# Patient Record
Sex: MALE | Race: WHITE | HISPANIC OR LATINO
[De-identification: names, ages, dates, MRNs, and addresses within clinical notes are randomized per-mention and may not be internally consistent; named-entity substitution may affect disease eponyms.]

---

## 2019-08-27 ENCOUNTER — APPOINTMENT (OUTPATIENT)
Dept: ULTRASOUND IMAGING | Facility: CLINIC | Age: 44
End: 2019-08-27

## 2020-03-24 ENCOUNTER — EMERGENCY (EMERGENCY)
Facility: HOSPITAL | Age: 45
LOS: 1 days | Discharge: HOME | End: 2020-03-24
Attending: EMERGENCY MEDICINE
Payer: COMMERCIAL

## 2020-03-24 VITALS — RESPIRATION RATE: 18 BRPM | HEART RATE: 92 BPM | OXYGEN SATURATION: 98 %

## 2020-03-24 VITALS
HEART RATE: 88 BPM | DIASTOLIC BLOOD PRESSURE: 95 MMHG | TEMPERATURE: 98 F | WEIGHT: 184.97 LBS | RESPIRATION RATE: 18 BRPM | SYSTOLIC BLOOD PRESSURE: 136 MMHG | HEIGHT: 67 IN

## 2020-03-24 PROCEDURE — 99283 EMERGENCY DEPT VISIT LOW MDM: CPT

## 2020-03-24 RX ORDER — ONDANSETRON 8 MG/1
1 TABLET, FILM COATED ORAL
Qty: 10 | Refills: 0
Start: 2020-03-24 | End: 2020-04-02

## 2020-03-24 NOTE — ED ADULT TRIAGE NOTE - CHIEF COMPLAINT QUOTE
c/o was exposed to a coworker with Corona virus, last tuesday, Saturday started with a fever , c/o nausea, back pain, + cough

## 2020-03-24 NOTE — ED PROVIDER NOTE - CLINICAL SUMMARY MEDICAL DECISION MAKING FREE TEXT BOX
male with + covid contact with 4 days congestion cough and 1 day nausea and 1 episode of vomiting nontoxic appearing male stable vitals. advised on conservative management self quarantine instructions and strict return precautions

## 2020-03-24 NOTE — ED PROVIDER NOTE - OBJECTIVE STATEMENT
45 yo male hx of niddm and RA presenting to the ER with cold symptoms including congestion and cough with mild nausea and one episode of vomiting today. + covid positive contact. states that symptoms started on saturday. unsure of fevers at home. denies chest pain sob or abdominal pain no diarrhea. no medication taken for symptoms

## 2020-03-24 NOTE — ED PROVIDER NOTE - PROGRESS NOTE DETAILS
advised on monitoring symptoms at home, conservative management of symptoms and strict return percautions.   pt verbalizes understanding

## 2020-03-24 NOTE — ED PROVIDER NOTE - NSFOLLOWUPINSTRUCTIONS_ED_ALL_ED_FT
tylenol 650mg every 6 hours for symptoms     READ ALL ATTACHED INSTRUCTIONS FOR CORONAVIRUS IMMEDIATELY   - Do not go to work/school/public areas/public transit.  - Self quarantine for 14 days.  - Monitor your symptoms using symptom tracker.  - Practice social distancing and avoid contact with others. Avoid sharing personal household items.   - Practice proper hand hygiene. Disinfect surfaces frequently. Cover your coughs and sneezes.   - Stay hydrated.      SEEK IMMEDIATE MEDICAL CARE IF YOU HAVE ANY OF THE FOLLOWING SYMPTOMS  **Seek immediate medicate attention if you develop new/worsening, signs/symptoms or concerns including, but not limited to shortness of breath, difficulty breathing, chest pain, confusion, severe weakness.**    If you believe you are experiencing a medical emergency call 9-1-1.

## 2020-03-24 NOTE — ED PROVIDER NOTE - PATIENT PORTAL LINK FT
You can access the FollowMyHealth Patient Portal offered by [facility] by registering at the following website: [email or web address]. By joining [employer]’s FollowMyHealth portal, you will also be able to view your health information using other applications (apps) compatible with our system.

## 2020-03-24 NOTE — ED PROVIDER NOTE - ATTENDING CONTRIBUTION TO CARE
The patient seen and examined    Viral syndrome  Most likely COVID-19   The patient appears well  Clear instructions given    I, Dany Martinez, performed the initial face to face bedside interview with this patient regarding history of present illness, review of symptoms and relevant past medical, social and family history.  I completed an independent physical examination.  I was the initial provider who evaluated this patient. I have signed out the follow up of any pending tests (i.e. labs, radiological studies) to the ACP.  I have communicated the patient’s plan of care and disposition with the ACP.

## 2022-01-10 ENCOUNTER — EMERGENCY (EMERGENCY)
Facility: HOSPITAL | Age: 47
LOS: 1 days | Discharge: HOME | End: 2022-01-10
Attending: EMERGENCY MEDICINE | Admitting: EMERGENCY MEDICINE
Payer: COMMERCIAL

## 2022-01-10 VITALS
RESPIRATION RATE: 20 BRPM | TEMPERATURE: 98 F | DIASTOLIC BLOOD PRESSURE: 96 MMHG | HEART RATE: 102 BPM | OXYGEN SATURATION: 94 % | SYSTOLIC BLOOD PRESSURE: 135 MMHG | HEIGHT: 67 IN | WEIGHT: 179.9 LBS

## 2022-01-10 LAB
ALBUMIN SERPL ELPH-MCNC: 4.4 G/DL — SIGNIFICANT CHANGE UP (ref 3.3–5.2)
ALP SERPL-CCNC: 166 U/L — HIGH (ref 40–120)
ALT FLD-CCNC: 47 U/L — HIGH
AMPHET UR-MCNC: NEGATIVE — SIGNIFICANT CHANGE UP
ANION GAP SERPL CALC-SCNC: 15 MMOL/L — SIGNIFICANT CHANGE UP (ref 5–17)
AST SERPL-CCNC: 26 U/L — SIGNIFICANT CHANGE UP
BARBITURATES UR SCN-MCNC: NEGATIVE — SIGNIFICANT CHANGE UP
BASOPHILS # BLD AUTO: 0.05 K/UL — SIGNIFICANT CHANGE UP (ref 0–0.2)
BASOPHILS NFR BLD AUTO: 0.7 % — SIGNIFICANT CHANGE UP (ref 0–2)
BENZODIAZ UR-MCNC: NEGATIVE — SIGNIFICANT CHANGE UP
BILIRUB SERPL-MCNC: 0.2 MG/DL — LOW (ref 0.4–2)
BUN SERPL-MCNC: 19.1 MG/DL — SIGNIFICANT CHANGE UP (ref 8–20)
CALCIUM SERPL-MCNC: 9.3 MG/DL — SIGNIFICANT CHANGE UP (ref 8.6–10.2)
CHLORIDE SERPL-SCNC: 99 MMOL/L — SIGNIFICANT CHANGE UP (ref 98–107)
CO2 SERPL-SCNC: 21 MMOL/L — LOW (ref 22–29)
COCAINE METAB.OTHER UR-MCNC: NEGATIVE — SIGNIFICANT CHANGE UP
CREAT SERPL-MCNC: 0.87 MG/DL — SIGNIFICANT CHANGE UP (ref 0.5–1.3)
EOSINOPHIL # BLD AUTO: 0.22 K/UL — SIGNIFICANT CHANGE UP (ref 0–0.5)
EOSINOPHIL NFR BLD AUTO: 3.1 % — SIGNIFICANT CHANGE UP (ref 0–6)
ETHANOL SERPL-MCNC: <10 MG/DL — SIGNIFICANT CHANGE UP (ref 0–9)
GLUCOSE SERPL-MCNC: 347 MG/DL — HIGH (ref 70–99)
HCT VFR BLD CALC: 47.2 % — SIGNIFICANT CHANGE UP (ref 39–50)
HGB BLD-MCNC: 16 G/DL — SIGNIFICANT CHANGE UP (ref 13–17)
IMM GRANULOCYTES NFR BLD AUTO: 1 % — SIGNIFICANT CHANGE UP (ref 0–1.5)
LYMPHOCYTES # BLD AUTO: 3.1 K/UL — SIGNIFICANT CHANGE UP (ref 1–3.3)
LYMPHOCYTES # BLD AUTO: 43.7 % — SIGNIFICANT CHANGE UP (ref 13–44)
MCHC RBC-ENTMCNC: 29.1 PG — SIGNIFICANT CHANGE UP (ref 27–34)
MCHC RBC-ENTMCNC: 33.9 GM/DL — SIGNIFICANT CHANGE UP (ref 32–36)
MCV RBC AUTO: 86 FL — SIGNIFICANT CHANGE UP (ref 80–100)
METHADONE UR-MCNC: NEGATIVE — SIGNIFICANT CHANGE UP
MONOCYTES # BLD AUTO: 0.51 K/UL — SIGNIFICANT CHANGE UP (ref 0–0.9)
MONOCYTES NFR BLD AUTO: 7.2 % — SIGNIFICANT CHANGE UP (ref 2–14)
NEUTROPHILS # BLD AUTO: 3.15 K/UL — SIGNIFICANT CHANGE UP (ref 1.8–7.4)
NEUTROPHILS NFR BLD AUTO: 44.3 % — SIGNIFICANT CHANGE UP (ref 43–77)
OPIATES UR-MCNC: NEGATIVE — SIGNIFICANT CHANGE UP
PCP SPEC-MCNC: SIGNIFICANT CHANGE UP
PCP UR-MCNC: NEGATIVE — SIGNIFICANT CHANGE UP
PLATELET # BLD AUTO: 215 K/UL — SIGNIFICANT CHANGE UP (ref 150–400)
POTASSIUM SERPL-MCNC: 4.1 MMOL/L — SIGNIFICANT CHANGE UP (ref 3.5–5.3)
POTASSIUM SERPL-SCNC: 4.1 MMOL/L — SIGNIFICANT CHANGE UP (ref 3.5–5.3)
PROT SERPL-MCNC: 7.4 G/DL — SIGNIFICANT CHANGE UP (ref 6.6–8.7)
RBC # BLD: 5.49 M/UL — SIGNIFICANT CHANGE UP (ref 4.2–5.8)
RBC # FLD: 12.2 % — SIGNIFICANT CHANGE UP (ref 10.3–14.5)
SODIUM SERPL-SCNC: 135 MMOL/L — SIGNIFICANT CHANGE UP (ref 135–145)
THC UR QL: NEGATIVE — SIGNIFICANT CHANGE UP
WBC # BLD: 7.1 K/UL — SIGNIFICANT CHANGE UP (ref 3.8–10.5)
WBC # FLD AUTO: 7.1 K/UL — SIGNIFICANT CHANGE UP (ref 3.8–10.5)

## 2022-01-10 PROCEDURE — 99285 EMERGENCY DEPT VISIT HI MDM: CPT

## 2022-01-10 RX ORDER — ACETAMINOPHEN 500 MG
650 TABLET ORAL ONCE
Refills: 0 | Status: COMPLETED | OUTPATIENT
Start: 2022-01-10 | End: 2022-01-10

## 2022-01-10 RX ORDER — LEVETIRACETAM 250 MG/1
1500 TABLET, FILM COATED ORAL ONCE
Refills: 0 | Status: COMPLETED | OUTPATIENT
Start: 2022-01-10 | End: 2022-01-10

## 2022-01-10 RX ORDER — SODIUM CHLORIDE 9 MG/ML
3 INJECTION INTRAMUSCULAR; INTRAVENOUS; SUBCUTANEOUS ONCE
Refills: 0 | Status: COMPLETED | OUTPATIENT
Start: 2022-01-10 | End: 2022-01-10

## 2022-01-10 RX ADMIN — SODIUM CHLORIDE 3 MILLILITER(S): 9 INJECTION INTRAMUSCULAR; INTRAVENOUS; SUBCUTANEOUS at 22:21

## 2022-01-10 RX ADMIN — Medication 650 MILLIGRAM(S): at 22:21

## 2022-01-10 NOTE — ED PROVIDER NOTE - CLINICAL SUMMARY MEDICAL DECISION MAKING FREE TEXT BOX
check labs, CT head, if negative, pt to be d/c instructed to drive until further noticed, give f/u with Dr. Morejon

## 2022-01-10 NOTE — ED PROVIDER NOTE - NSFOLLOWUPINSTRUCTIONS_ED_ALL_ED_FT
Keppra 500 mg 2x daily  Follow up with epileptologist (seizure doctor) later today-call for appt  No driving until further notice  Return sooner for any problems      Seizure    A seizure is abnormal electrical activity in the brain; the specific cause may or may not be found. Prior to a seizure you may experience a warning sensation (aura) that may include fear, nausea, dizziness, and visual changes such as flashing lights of spots. Common symptoms during the seizure may include an altered mental status, rhythmic jerking movements, drooling, grunting, loss of bladder or bowel control, or tongue biting. After a seizure, you may feel confused and sleepy.     Do not swim, drive, operate machinery, or engage in any risky activity during which a seizure could cause further injury to you or others. Teach friends and family what to do if you HAVE a seizure which includes laying you on the ground with your head on a cushion and turning you to the side to keep your breathing passages clear in case of vomiting.    SEEK IMMEDIATE MEDICAL CARE IF YOU HAVE ANY OF THE FOLLOWING SYMPTOMS: seizure lasting over 5 minutes, not waking up or persistent altered mental status after the seizure, or more frequent or worsening seizures.

## 2022-01-10 NOTE — ED ADULT TRIAGE NOTE - CHIEF COMPLAINT QUOTE
BIBEMS S/P possible seizure like activity at home.  Pt reports he was drinking coffee on the couch and then began to convulse, witnessed  by wife.  Happened two times.  Did not fall to ground or hit head.  Pt now C/O of a headache.  No seizure hx.  Hx of DM and is on Metformin but did not take it today.  in triage.

## 2022-01-10 NOTE — ED PROVIDER NOTE - OBJECTIVE STATEMENT
45 y/o male with PMHx of DM, RA presents with new onset seizures. Pt states he does remember the event. Pt states earlier today he did not have a headache, went to the [employer] with his family, then was on the couch drinking coffee and his family witnessed seizures like activity. Pt had tongue biting, but denies urinary incontinence. Pt also c/o bilateral leg stiffness. Pt denies recent illness.

## 2022-01-10 NOTE — ED PROVIDER NOTE - PATIENT PORTAL LINK FT
You can access the FollowMyHealth Patient Portal offered by [facility] by registering at the following website: [email or web address]. By joining [employer]’s FollowMyHealth portal, you will also be able to view your health information using other applications (apps) compatible with our system.
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
## 2022-01-10 NOTE — ED ADULT NURSE NOTE - OBJECTIVE STATEMENT
pt a&ox3 had unwitnessed seizure at home and does not recall anything at all. RR even and unlabored line lab and urine sent, pt educated on plan of care, pt able to successfully teach back plan of care to RN, RN will continue to reeducate pt during hospital stay.

## 2022-01-11 LAB
CK MB CFR SERPL CALC: 3 NG/ML — SIGNIFICANT CHANGE UP (ref 0–6.7)
CK SERPL-CCNC: 198 U/L — SIGNIFICANT CHANGE UP (ref 30–200)
TROPONIN T SERPL-MCNC: <0.01 NG/ML — SIGNIFICANT CHANGE UP (ref 0–0.06)

## 2022-01-11 PROCEDURE — 70450 CT HEAD/BRAIN W/O DYE: CPT | Mod: 26,MA

## 2022-01-11 PROCEDURE — 99284 EMERGENCY DEPT VISIT MOD MDM: CPT | Mod: 25

## 2022-01-11 PROCEDURE — 82550 ASSAY OF CK (CPK): CPT

## 2022-01-11 PROCEDURE — 80307 DRUG TEST PRSMV CHEM ANLYZR: CPT

## 2022-01-11 PROCEDURE — 70450 CT HEAD/BRAIN W/O DYE: CPT | Mod: MA

## 2022-01-11 PROCEDURE — 85025 COMPLETE CBC W/AUTO DIFF WBC: CPT

## 2022-01-11 PROCEDURE — 82553 CREATINE MB FRACTION: CPT

## 2022-01-11 PROCEDURE — 96365 THER/PROPH/DIAG IV INF INIT: CPT

## 2022-01-11 PROCEDURE — 36415 COLL VENOUS BLD VENIPUNCTURE: CPT

## 2022-01-11 PROCEDURE — 82962 GLUCOSE BLOOD TEST: CPT

## 2022-01-11 PROCEDURE — 80053 COMPREHEN METABOLIC PANEL: CPT

## 2022-01-11 PROCEDURE — 84484 ASSAY OF TROPONIN QUANT: CPT

## 2022-01-11 RX ORDER — LEVETIRACETAM 250 MG/1
1 TABLET, FILM COATED ORAL
Qty: 30 | Refills: 0
Start: 2022-01-11 | End: 2022-01-25

## 2022-01-11 RX ADMIN — LEVETIRACETAM 1500 MILLIGRAM(S): 250 TABLET, FILM COATED ORAL at 00:33

## 2022-01-11 RX ADMIN — LEVETIRACETAM 400 MILLIGRAM(S): 250 TABLET, FILM COATED ORAL at 00:09

## 2022-01-11 RX ADMIN — Medication 650 MILLIGRAM(S): at 00:07

## 2022-01-14 PROBLEM — E11.9 TYPE 2 DIABETES MELLITUS WITHOUT COMPLICATIONS: Chronic | Status: ACTIVE | Noted: 2022-01-11

## 2022-01-14 PROBLEM — M06.9 RHEUMATOID ARTHRITIS, UNSPECIFIED: Chronic | Status: ACTIVE | Noted: 2022-01-11

## 2022-01-28 ENCOUNTER — APPOINTMENT (OUTPATIENT)
Dept: NEUROLOGY | Facility: CLINIC | Age: 47
End: 2022-01-28
Payer: COMMERCIAL

## 2022-01-28 ENCOUNTER — NON-APPOINTMENT (OUTPATIENT)
Age: 47
End: 2022-01-28

## 2022-01-28 VITALS
HEIGHT: 66 IN | BODY MASS INDEX: 29.27 KG/M2 | OXYGEN SATURATION: 98 % | DIASTOLIC BLOOD PRESSURE: 84 MMHG | HEART RATE: 82 BPM | WEIGHT: 182.1 LBS | TEMPERATURE: 98.2 F | SYSTOLIC BLOOD PRESSURE: 128 MMHG

## 2022-01-28 DIAGNOSIS — Z78.9 OTHER SPECIFIED HEALTH STATUS: ICD-10-CM

## 2022-01-28 DIAGNOSIS — Z87.39 PERSONAL HISTORY OF OTHER DISEASES OF THE MUSCULOSKELETAL SYSTEM AND CONNECTIVE TISSUE: ICD-10-CM

## 2022-01-28 DIAGNOSIS — R56.9 UNSPECIFIED CONVULSIONS: ICD-10-CM

## 2022-01-28 PROCEDURE — 99205 OFFICE O/P NEW HI 60 MIN: CPT

## 2022-01-28 PROCEDURE — 93040 RHYTHM ECG WITH REPORT: CPT

## 2022-01-28 PROCEDURE — 95816 EEG AWAKE AND DROWSY: CPT
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
## 2022-01-28 NOTE — PHYSICAL EXAM
[FreeTextEntry1] : GENERAL PHYSICAL EXAM:\par GEN: no distress, normal affect\par HEENT: NCAT, OP clear\par EYES: sclera white, conjunctiva clear, no nystagmus\par NECK: supple\par CV: RRR    		\par PULM: CTAB, no wheezing\par GI: soft ABD, +BS, NT, ND\par EXT: peripheral pulse intact, no cyanosis\par MSK: muscle tone and strength normal\par SKIN: warm, dry, no rash or lesion on exposed skin \par \par NEUROLOGICAL EXAM:\par Mental Status\par Orientation: awake and alert  // alert and oriented to person, place, time, and situation \par Language: clear and fluent\par \par Cranial Nerves\par II: full visual fields intact \par III, IV, VI: PERRL, EOMI\par V, VII: facial sensation and movement intact and symmetric \par VIII: hearing intact \par IX, X: uvula midline, soft palate elevates normally \par XI: BL shoulder shrug intact \par XII: tongue midline\par \par Motor\par 5/5 in RUE, RLE\par 5/5 in LUE, LLE             \par Tone and bulk are normal in upper and lower limbs\par No pronator drift\par \par Sensation\par Intact to light touch and pinprick in all 4 EXTs\par \par Reflex\par 2+ in BL biceps, triceps, brachioradialis, patella, ankle                                    \par Plantar responses downward bilaterally\par \par Coordination\par Normal FTN bilaterally\par \par Gait\par Ambulates independently \par Normal stance, stride, and pivot turn\par Tandem walk intact\par Negative Romberg\par

## 2022-01-28 NOTE — ASSESSMENT
[FreeTextEntry1] : YEMI ANAYA is a 46 year-old RH male with a history of DM and RA who presents for workup after first lifetime BTC sz without obvious risk factors. \par \par \par - Admit to EMU 2/18. The purpose of the EMU admission is to establish the first diagnosis of a seizure disorder, which is medically necessary to guide clinical management and select the most appropriate therapeutic regimen. The expected length of stay is 3-4 days.\par  \par - hold ASM for now\par - MRI brain w/o, epilepsy protocol\par - Patient was educated regarding risks and driving privileges associated with the [facility] Guidelines; patient instructed not to drive. \par - f/u pending EMU result\par \par \par Personally reviewed all images, labs and other studies. More than 50% of time spent on counseling and educating patient on differential, workup, disease course, and treatment/management. All questions and concerns answered and addressed in detail to patient's complete satisfaction. Patient verbalized understanding and agreed to plan.\par

## 2022-01-28 NOTE — CONSULT LETTER
[Dear  ___] : Dear  [unfilled], [Sincerely,] : Sincerely, [FreeTextEntry1] : I had the pleasure of seeing your patient, YEMI ANAYA, in my office today. Please see my note below. \par \par If you have any questions, please do not hesitate to contact me.  [FreeTextEntry3] : Jaja Morejon MD\par Director, Epilepsy/EMU\par [facility] \par [facility] [place]\par [address] [place] [address] \par Tel: [phone]; Fax: [phone]

## 2022-02-08 ENCOUNTER — OUTPATIENT (OUTPATIENT)
Dept: OUTPATIENT SERVICES | Facility: HOSPITAL | Age: 47
LOS: 1 days | End: 2022-02-08

## 2022-02-08 DIAGNOSIS — R56.9 UNSPECIFIED CONVULSIONS: ICD-10-CM

## 2022-02-16 ENCOUNTER — APPOINTMENT (OUTPATIENT)
Dept: MRI IMAGING | Facility: CLINIC | Age: 47
End: 2022-02-16
Payer: COMMERCIAL

## 2022-02-16 ENCOUNTER — OUTPATIENT (OUTPATIENT)
Dept: OUTPATIENT SERVICES | Facility: HOSPITAL | Age: 47
LOS: 1 days | End: 2022-02-16
Payer: COMMERCIAL

## 2022-02-16 DIAGNOSIS — R56.9 UNSPECIFIED CONVULSIONS: ICD-10-CM

## 2022-02-16 DIAGNOSIS — Z00.00 ENCOUNTER FOR GENERAL ADULT MEDICAL EXAMINATION WITHOUT ABNORMAL FINDINGS: ICD-10-CM

## 2022-02-16 PROCEDURE — A9585: CPT

## 2022-02-16 PROCEDURE — 70553 MRI BRAIN STEM W/O & W/DYE: CPT | Mod: 26

## 2022-02-16 PROCEDURE — 70553 MRI BRAIN STEM W/O & W/DYE: CPT

## 2022-02-16 PROCEDURE — 76377 3D RENDER W/INTRP POSTPROCES: CPT

## 2022-02-16 PROCEDURE — 76377 3D RENDER W/INTRP POSTPROCES: CPT | Mod: 26

## 2022-03-18 ENCOUNTER — INPATIENT (INPATIENT)
Facility: HOSPITAL | Age: 47
LOS: 1 days | Discharge: HOME | DRG: 101 | End: 2022-03-20
Attending: INTERNAL MEDICINE | Admitting: FAMILY MEDICINE
Payer: COMMERCIAL

## 2022-03-18 VITALS
TEMPERATURE: 98 F | DIASTOLIC BLOOD PRESSURE: 82 MMHG | OXYGEN SATURATION: 96 % | RESPIRATION RATE: 15 BRPM | HEART RATE: 72 BPM | SYSTOLIC BLOOD PRESSURE: 130 MMHG

## 2022-03-18 DIAGNOSIS — R56.9 UNSPECIFIED CONVULSIONS: ICD-10-CM

## 2022-03-18 LAB
GLUCOSE BLDC GLUCOMTR-MCNC: 174 MG/DL — HIGH (ref 70–99)
GLUCOSE BLDC GLUCOMTR-MCNC: 212 MG/DL — HIGH (ref 70–99)

## 2022-03-18 PROCEDURE — 99223 1ST HOSP IP/OBS HIGH 75: CPT

## 2022-03-18 PROCEDURE — 95718 EEG PHYS/QHP 2-12 HR W/VEEG: CPT

## 2022-03-18 PROCEDURE — 99232 SBSQ HOSP IP/OBS MODERATE 35: CPT

## 2022-03-18 RX ORDER — DEXTROSE 50 % IN WATER 50 %
25 SYRINGE (ML) INTRAVENOUS ONCE
Refills: 0 | Status: DISCONTINUED | OUTPATIENT
Start: 2022-03-18 | End: 2022-03-20

## 2022-03-18 RX ORDER — SODIUM CHLORIDE 9 MG/ML
1000 INJECTION, SOLUTION INTRAVENOUS
Refills: 0 | Status: DISCONTINUED | OUTPATIENT
Start: 2022-03-18 | End: 2022-03-20

## 2022-03-18 RX ORDER — ACETAMINOPHEN 500 MG
650 TABLET ORAL EVERY 6 HOURS
Refills: 0 | Status: DISCONTINUED | OUTPATIENT
Start: 2022-03-18 | End: 2022-03-20

## 2022-03-18 RX ORDER — ETANERCEPT 25 MG
1 VIAL (EA) SUBCUTANEOUS
Qty: 0 | Refills: 0 | DISCHARGE

## 2022-03-18 RX ORDER — INSULIN LISPRO 100/ML
VIAL (ML) SUBCUTANEOUS
Refills: 0 | Status: DISCONTINUED | OUTPATIENT
Start: 2022-03-18 | End: 2022-03-20

## 2022-03-18 RX ORDER — INFLUENZA VIRUS VACCINE 15; 15; 15; 15 UG/.5ML; UG/.5ML; UG/.5ML; UG/.5ML
0.5 SUSPENSION INTRAMUSCULAR ONCE
Refills: 0 | Status: DISCONTINUED | OUTPATIENT
Start: 2022-03-18 | End: 2022-03-20

## 2022-03-18 RX ORDER — DEXTROSE 50 % IN WATER 50 %
15 SYRINGE (ML) INTRAVENOUS ONCE
Refills: 0 | Status: DISCONTINUED | OUTPATIENT
Start: 2022-03-18 | End: 2022-03-20

## 2022-03-18 RX ORDER — GLUCAGON INJECTION, SOLUTION 0.5 MG/.1ML
1 INJECTION, SOLUTION SUBCUTANEOUS ONCE
Refills: 0 | Status: DISCONTINUED | OUTPATIENT
Start: 2022-03-18 | End: 2022-03-20

## 2022-03-18 NOTE — CONSULT NOTE ADULT - ASSESSMENT
47 yo man with first lifetime BTC sz without seizure risk factors admitted to EMU to evaluate for epilepsy    First BTC sz  continuous video EEG  no antiseizure medication indicated at this time  Seizure precautions  Ativan 2mg IV push prn convulsive seizure    Plan discussed with Dr. Merlos    Will continue to follow

## 2022-03-18 NOTE — PATIENT PROFILE ADULT - FUNCTIONAL ASSESSMENT - BASIC MOBILITY 4.
Please call and get eye exam from Dr. Evans Choi at [facility] by Tatianna Mistry    Also please mail pt lab orders to him 4 = No assist / stand by assistance

## 2022-03-18 NOTE — H&P ADULT - ASSESSMENT
Pt is a 45yo M with PMH of RA, DM sent from Dr. Morejon office for 24hr EEG. Pt found to have episode of seizure like activity 1/10 and was admitted at [facility] and has been following dr. Morejon. Pt was placed on Keppra 500mg BID x 1 month as dose was 1 month ago. Pt was sent to ED for 24hr EEG to evaluation regarding need for continue keppra.     *Seizure   Admit to medicine   last dose of keppra 500mg BID was 1 month ago   24hr EEG   neurology consult appreciated    *DM   Hold metformin   ISS   f/u HgA1C     *RA  Etanercept 50mg SQ q week   last dose sunday     DVT ppx  Ambulation as needed     Dispo likely 48hr

## 2022-03-18 NOTE — H&P ADULT - HISTORY OF PRESENT ILLNESS
Pt is a 47yo M with PMH of RA, DM sent from Dr. Morejon office for 24hr EEG. Pt found to have episode of seizure like activity 1/10 and was admitted at [facility] and has been following dr. Morejon. Pt was placed on Keppra 500mg BID x 1 month as dose was 1 month ago. Pt was sent to ED for 24hr EEG to evaluation regarding need for continue keppra.

## 2022-03-18 NOTE — H&P ADULT - NSHPPHYSICALEXAM_GEN_ALL_CORE
Vital Signs Last 24 Hrs  T(C): 36.5 (18 Mar 2022 14:30), Max: 36.5 (18 Mar 2022 14:30)  T(F): 97.7 (18 Mar 2022 14:30), Max: 97.7 (18 Mar 2022 14:30)  HR: 72 (18 Mar 2022 14:30) (72 - 72)  BP: 130/82 (18 Mar 2022 14:30) (130/82 - 130/82)  BP(mean): --  RR: 15 (18 Mar 2022 14:30) (15 - 15)  SpO2: 96% (18 Mar 2022 14:30) (96% - 96%)    PHYSICAL EXAM:    GENERAL: NAD, well-groomed, well-developed  HEAD:  NC/AT  EYES: EOMI, PERRLA, no scleral icterus  HEENT: Moist mucous membranes  NECK: Supple, No JVD  CNS:  Alert & Oriented X3, Motor Strength 5/5 B/L upper and lower extremities; DTRs 2+ intact   LUNG: Clear to auscultation bilaterally; No rales, rhonchi, wheezing, or rubs  HEART: RRR; No murmurs, rubs, or gallops  ABDOMEN: +BS, ST/ND/NT  EXTREMITIES:  2+ Peripheral Pulses, No clubbing, cyanosis, or edema  MUSCULOSKELTAL- Joints normal ROM, no Muscle or joint tenderness
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
## 2022-03-19 LAB
A1C WITH ESTIMATED AVERAGE GLUCOSE RESULT: 9 % — HIGH (ref 4–5.6)
ESTIMATED AVERAGE GLUCOSE: 212 MG/DL — HIGH (ref 68–114)
GLUCOSE BLDC GLUCOMTR-MCNC: 190 MG/DL — HIGH (ref 70–99)

## 2022-03-19 PROCEDURE — 95720 EEG PHY/QHP EA INCR W/VEEG: CPT

## 2022-03-19 PROCEDURE — 99232 SBSQ HOSP IP/OBS MODERATE 35: CPT
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
## 2022-03-20 ENCOUNTER — TRANSCRIPTION ENCOUNTER (OUTPATIENT)
Age: 47
End: 2022-03-20

## 2022-03-20 VITALS
HEART RATE: 60 BPM | RESPIRATION RATE: 18 BRPM | OXYGEN SATURATION: 97 % | TEMPERATURE: 97 F | DIASTOLIC BLOOD PRESSURE: 86 MMHG | SYSTOLIC BLOOD PRESSURE: 131 MMHG

## 2022-03-20 LAB
CHOLEST SERPL-MCNC: 186 MG/DL — SIGNIFICANT CHANGE UP
GLUCOSE BLDC GLUCOMTR-MCNC: 157 MG/DL — HIGH (ref 70–99)
HDLC SERPL-MCNC: 35 MG/DL — LOW
LIPID PNL WITH DIRECT LDL SERPL: 112 MG/DL — HIGH
NON HDL CHOLESTEROL: 151 MG/DL — HIGH
TRIGL SERPL-MCNC: 194 MG/DL — HIGH
TSH SERPL-MCNC: 1.64 UIU/ML — SIGNIFICANT CHANGE UP (ref 0.27–4.2)

## 2022-03-20 PROCEDURE — 82962 GLUCOSE BLOOD TEST: CPT

## 2022-03-20 PROCEDURE — 80061 LIPID PANEL: CPT

## 2022-03-20 PROCEDURE — 99232 SBSQ HOSP IP/OBS MODERATE 35: CPT

## 2022-03-20 PROCEDURE — 95711 VEEG 2-12 HR UNMONITORED: CPT

## 2022-03-20 PROCEDURE — 95714 VEEG EA 12-26 HR UNMNTR: CPT

## 2022-03-20 PROCEDURE — 83036 HEMOGLOBIN GLYCOSYLATED A1C: CPT

## 2022-03-20 PROCEDURE — 95700 EEG CONT REC W/VID EEG TECH: CPT

## 2022-03-20 PROCEDURE — 84443 ASSAY THYROID STIM HORMONE: CPT

## 2022-03-20 PROCEDURE — 99238 HOSP IP/OBS DSCHRG MGMT 30/<: CPT

## 2022-03-20 PROCEDURE — 36415 COLL VENOUS BLD VENIPUNCTURE: CPT
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
## 2022-03-20 NOTE — DISCHARGE NOTE PROVIDER - NSDCMRMEDTOKEN_GEN_ALL_CORE_FT
etanercept 50 mg/mL subcutaneous solution: 1 milliliter(s) subcutaneous once a week  metFORMIN 750 mg oral tablet, extended release: 1 tab(s) orally once a day

## 2022-03-20 NOTE — DISCHARGE NOTE NURSING/CASE MANAGEMENT/SOCIAL WORK - PATIENT PORTAL LINK FT
You can access the FollowMyHealth Patient Portal offered by [facility] by registering at the following website: [email or web address]. By joining [facility]’s FollowMyHealth portal, you will also be able to view your health information using other applications (apps) compatible with our system.

## 2022-03-20 NOTE — PROGRESS NOTE ADULT - SUBJECTIVE AND OBJECTIVE BOX
[facility]                                                                     MD Magdalena Webster,                                               Epilepsy Consult #: [phone] ([phone])                                               Office: [address] [place] [address], [facility]                                                 Phone: [phone]; Fax: [phone]    EPILEPSY PROGRESS NOTE    Interval History:  No overnight events    Physical Exam  Vital Signs Last 24 Hrs  T(C): 36.3 (20 Mar 2022 06:35), Max: 36.4 (19 Mar 2022 22:14)  T(F): 97.3 (20 Mar 2022 06:35), Max: 97.5 (19 Mar 2022 22:14)  HR: 60 (20 Mar 2022 06:35) (60 - 74)  BP: 131/86 (20 Mar 2022 06:35) (129/79 - 137/85)  BP(mean): --  RR: 18 (20 Mar 2022 06:35) (15 - 18)  SpO2: 97% (20 Mar 2022 06:35) (96% - 97%)  General: no acute distress  HEENT:NC/AT  Lungs: no respiratory distress  Skin: no rash or ecchymoses  Lower extremities: no edema    Mental Status: AAO x 3, no dysarthria, no aphasia  CN: PERRL, EOMI, VFF, V1-V3 sensation intact, no facial asymmetry  Motor:  5/5 x 4 extremities  Sensory: intact to light touch throughout  Coordination: no dysmetria on FTN  Gait: steady    Imaging  MRI brain 2/16/22: chronic left lentiform nucleus infarct, symmetric normal appearing hippocampi, single coarse calcification along the left parietal lobe with slight T2 signal likely the residua of a remote infection or   inflammation  Routine EEG 1/2022: intermittent left slowing    cEEG 3/18/22-3/20/22 - normal
                                         [facility]                                                                     MD Magdalena Webster,                                               Epilepsy Consult #: [phone] ([phone])                                               Office: [address] [place] [address], [facility]                                                 Phone: [phone]; Fax: [phone]    EPILEPSY PROGRESS NOTE    Interval History:  No overnight events    Physical Exam  Vital Signs Last 24 Hrs  T(C): 36.3 (19 Mar 2022 10:23), Max: 36.9 (19 Mar 2022 06:48)  T(F): 97.4 (19 Mar 2022 10:23), Max: 98.4 (19 Mar 2022 06:48)  HR: 79 (19 Mar 2022 10:23) (70 - 79)  BP: 125/78 (19 Mar 2022 10:23) (111/66 - 130/82)  BP(mean): --  RR: 18 (19 Mar 2022 10:23) (15 - 18)  SpO2: 96% (19 Mar 2022 10:23) (96% - 99%)  General: no acute distress  HEENT:NC/AT  Lungs: no respiratory distress  Skin: no rash or ecchymoses  Lower extremities: no edema    Mental Status: AAO x 3, no dysarthria, no aphasia  CN: PERRL, EOMI, VFF, V1-V3 sensation intact, no facial asymmetry  Motor:  5/5 x 4 extremities  Sensory: intact to light touch throughout  Coordination: no dysmetria on FTN  Gait: steady    Imaging  MRI brain 2/16/22: chronic left lentiform nucleus infarct, symmetric normal appearing hippocampi, single coarse calcification along the left parietal lobe with slight T2 signal likely the residua of a remote infection or   inflammation  Routine EEG 1/2022: intermittent left slowing    cEEG 3/18/22-3/19/22 - normal
  seen for r/o seizure    no acute complaints  wants to go home  ros negative     MEDICATIONS  (STANDING):  dextrose 40% Gel 15 Gram(s) Oral once  dextrose 5%. 1000 milliLiter(s) (50 mL/Hr) IV Continuous <Continuous>  dextrose 50% Injectable 25 Gram(s) IV Push once  glucagon  Injectable 1 milliGRAM(s) IntraMuscular once  influenza   Vaccine 0.5 milliLiter(s) IntraMuscular once  insulin lispro (ADMELOG) corrective regimen sliding scale   SubCutaneous three times a day before meals    MEDICATIONS  (PRN):  acetaminophen     Tablet .. 650 milliGRAM(s) Oral every 6 hours PRN Temp greater or equal to 38C (100.4F), Mild Pain (1 - 3)      Allergies    No Known Allergies      Vital Signs Last 24 Hrs  T(C): 36.3 (19 Mar 2022 10:23), Max: 36.9 (19 Mar 2022 06:48)  T(F): 97.4 (19 Mar 2022 10:23), Max: 98.4 (19 Mar 2022 06:48)  HR: 79 (19 Mar 2022 10:23) (70 - 79)  BP: 125/78 (19 Mar 2022 10:23) (111/66 - 130/82)  BP(mean): --  RR: 18 (19 Mar 2022 10:23) (15 - 18)  SpO2: 96% (19 Mar 2022 10:23) (96% - 99%)    PHYSICAL EXAM:    GENERAL: NAD  CHEST/LUNG: Clear to ausculation bilaterally  HEART: Regular rate and rhythm; S1 S2  ABDOMEN: Soft, Bowel sounds present  EXTREMITIES:  no edema   NERVOUS SYSTEM:  Alert & Oriented X3, Motor Strength 5/5 B/L upper and lower extremities      LABS:                CAPILLARY BLOOD GLUCOSE      POCT Blood Glucose.: 174 mg/dL (18 Mar 2022 21:56)  POCT Blood Glucose.: 212 mg/dL (18 Mar 2022 17:02)        RADIOLOGY & ADDITIONAL TESTS:

## 2022-03-20 NOTE — DISCHARGE NOTE PROVIDER - CARE PROVIDER_API CALL
Jaja Morejon)  EEGEpilepsy; Neurology  [address] [place] [address]  Phone: [phone]  Fax: [phone]  Follow Up Time:     Juan Daniel Ward)  EndocrinologyMetabDiabetes; Internal Medicine  [facility] - Dept of Medicine, [address] [place] [address]  Phone: [phone]  Fax: [phone]  Follow Up Time:

## 2022-03-20 NOTE — PROGRESS NOTE ADULT - ASSESSMENT
47 yo man with first lifetime BTC sz without seizure risk factors admitted to EMU to evaluate for epilepsy    First BTC sz  continuous video EEG normal during stay  discontinue EEG  no antiseizure medication indicated at this time  Seizure precautions  Ativan 2mg IV push prn convulsive seizure  Pt advised not to drive until seizure free for 1 year in accordance with [place] law    Follow up with Dr. Morejon as an outpatient    Plan discussed with Dr. Hamlin    Thank you for allowing me to participate in this patient's care
45yo M with PMH of RA, DM2 sent from Dr. Morejon office for 24hr EEG. Pt found to have episode of seizure like activity 1/10 and was admitted at [facility] and has been following dr. Morejon. Pt was placed on Keppra 500mg BID x 15 days, last dose was 1 month ago. Pt was sent to ED for 24hr EEG to evaluation regarding need for continue keppra.     convulsions: r/o seizures    EEG ongoing     neurology following      DM2: a1c 9    Hold metformin     sliding scale    diabetes education    lipid panel, tsh    advised to f/u with pmd     *RA  Etanercept 50mg SQ q week   last dose sunday     DVT ppx  Ambulation as needed     Dispo   likely tomorrow per neurology discussion 
47 yo man with first lifetime BTC sz without seizure risk factors admitted to EMU to evaluate for epilepsy    First BTC sz  continuous video EEG  continue EEG  no antiseizure medication indicated at this time  Seizure precautions  Ativan 2mg IV push prn convulsive seizure  Likely discharge tomorrow if cEEG stable overnight    Plan discussed with Dr. Hamlin    Will continue to follow

## 2022-03-20 NOTE — DISCHARGE NOTE PROVIDER - NSDCCPCAREPLAN_GEN_ALL_CORE_FT
PRINCIPAL DISCHARGE DIAGNOSIS  Diagnosis: Convulsion  Assessment and Plan of Treatment: follow up with dr walsh      SECONDARY DISCHARGE DIAGNOSES  Diagnosis: Diabetes type 2, uncontrolled  Assessment and Plan of Treatment: follow up with primary care doctor and endocrinology    Diagnosis: High cholesterol  Assessment and Plan of Treatment:      PRINCIPAL DISCHARGE DIAGNOSIS  Diagnosis: Convulsion  Assessment and Plan of Treatment: follow up with dr walsh      SECONDARY DISCHARGE DIAGNOSES  Diagnosis: Diabetes type 2, uncontrolled  Assessment and Plan of Treatment: follow up with primary care doctor and endocrinology    Diagnosis: High cholesterol  Assessment and Plan of Treatment: continue home cholesterol medication

## 2022-03-20 NOTE — DISCHARGE NOTE PROVIDER - HOSPITAL COURSE
47yo M with PMH of RA, DM2 sent from Dr. Morejon office for 24hr EEG. Pt found to have episode of seizure like activity 1/10 and was admitted at [facility] and has been following dr. Morejon. Pt was placed on Keppra 500mg BID x 15 days, last dose was 1 month ago. Pt was sent to ED for 24hr EEG to evaluation regarding need for continue keppra.   Underwent eeg per epilepsy service. no seizures noted.  noted to have elevated hga1c and high cholesterol, advised to follow up with PMD.    dc home.

## 2022-03-20 NOTE — DISCHARGE NOTE PROVIDER - CARE PROVIDERS DIRECT ADDRESSES
[email or web address]

## 2022-03-21 ENCOUNTER — TRANSCRIPTION ENCOUNTER (OUTPATIENT)
Age: 47
End: 2022-03-21

## 2022-07-25 ENCOUNTER — TRANSCRIPTION ENCOUNTER (OUTPATIENT)
Age: 47
End: 2022-07-25

## 2022-09-16 ENCOUNTER — INPATIENT (INPATIENT)
Facility: HOSPITAL | Age: 47
LOS: 3 days | Discharge: HOME | DRG: 101 | End: 2022-09-20
Attending: INTERNAL MEDICINE | Admitting: HOSPITALIST
Payer: COMMERCIAL

## 2022-09-16 VITALS
DIASTOLIC BLOOD PRESSURE: 89 MMHG | OXYGEN SATURATION: 95 % | TEMPERATURE: 99 F | HEIGHT: 67 IN | WEIGHT: 184.97 LBS | HEART RATE: 105 BPM | SYSTOLIC BLOOD PRESSURE: 144 MMHG | RESPIRATION RATE: 16 BRPM

## 2022-09-16 LAB
BASOPHILS # BLD AUTO: 0.05 K/UL — SIGNIFICANT CHANGE UP (ref 0–0.2)
BASOPHILS NFR BLD AUTO: 0.5 % — SIGNIFICANT CHANGE UP (ref 0–2)
EOSINOPHIL # BLD AUTO: 0.22 K/UL — SIGNIFICANT CHANGE UP (ref 0–0.5)
EOSINOPHIL NFR BLD AUTO: 2.2 % — SIGNIFICANT CHANGE UP (ref 0–6)
HCT VFR BLD CALC: 45.9 % — SIGNIFICANT CHANGE UP (ref 39–50)
HGB BLD-MCNC: 15.7 G/DL — SIGNIFICANT CHANGE UP (ref 13–17)
IMM GRANULOCYTES NFR BLD AUTO: 0.6 % — SIGNIFICANT CHANGE UP (ref 0–0.9)
LYMPHOCYTES # BLD AUTO: 2.21 K/UL — SIGNIFICANT CHANGE UP (ref 1–3.3)
LYMPHOCYTES # BLD AUTO: 22.3 % — SIGNIFICANT CHANGE UP (ref 13–44)
MCHC RBC-ENTMCNC: 29.8 PG — SIGNIFICANT CHANGE UP (ref 27–34)
MCHC RBC-ENTMCNC: 34.2 GM/DL — SIGNIFICANT CHANGE UP (ref 32–36)
MCV RBC AUTO: 87.1 FL — SIGNIFICANT CHANGE UP (ref 80–100)
MONOCYTES # BLD AUTO: 0.61 K/UL — SIGNIFICANT CHANGE UP (ref 0–0.9)
MONOCYTES NFR BLD AUTO: 6.2 % — SIGNIFICANT CHANGE UP (ref 2–14)
NEUTROPHILS # BLD AUTO: 6.75 K/UL — SIGNIFICANT CHANGE UP (ref 1.8–7.4)
NEUTROPHILS NFR BLD AUTO: 68.2 % — SIGNIFICANT CHANGE UP (ref 43–77)
PLATELET # BLD AUTO: 232 K/UL — SIGNIFICANT CHANGE UP (ref 150–400)
RBC # BLD: 5.27 M/UL — SIGNIFICANT CHANGE UP (ref 4.2–5.8)
RBC # FLD: 12.7 % — SIGNIFICANT CHANGE UP (ref 10.3–14.5)
WBC # BLD: 9.9 K/UL — SIGNIFICANT CHANGE UP (ref 3.8–10.5)
WBC # FLD AUTO: 9.9 K/UL — SIGNIFICANT CHANGE UP (ref 3.8–10.5)

## 2022-09-16 PROCEDURE — 93010 ELECTROCARDIOGRAM REPORT: CPT

## 2022-09-16 PROCEDURE — 99285 EMERGENCY DEPT VISIT HI MDM: CPT

## 2022-09-16 RX ORDER — SODIUM CHLORIDE 9 MG/ML
1000 INJECTION INTRAMUSCULAR; INTRAVENOUS; SUBCUTANEOUS ONCE
Refills: 0 | Status: COMPLETED | OUTPATIENT
Start: 2022-09-16 | End: 2022-09-16

## 2022-09-16 RX ADMIN — SODIUM CHLORIDE 1000 MILLILITER(S): 9 INJECTION INTRAMUSCULAR; INTRAVENOUS; SUBCUTANEOUS at 23:47

## 2022-09-16 NOTE — ED PROVIDER NOTE - ATTENDING CONTRIBUTION TO CARE
Cody DRAKE: I performed a face to face evaluation of this patient and performed a full history and physical examination on the patient.  I agree with the resident's history, physical examination, and plan of the patient unless otherwise noted. My brief assessment is as follows:    47y M w/ hx RA (on Enbrel), DM; presents for seizure. Reports prior hx of seizure 9 months ago; underwent extensive evaluation at that time including EEG and MRGeronimo Was previously on Keppra but had it discontinued (follows with Dr. Morejon of epilepsy). Tonight prior to arrival had onset of RUE/RLE stiffness, followed by GTC lasting around 10 minutes (witnessed by wife). +Tongue bite, no incontinence. Now back to baseline. Nonfocal neuro exam. Will check CT head, EKG, labs, reassess. Will consult epilepsy.

## 2022-09-16 NOTE — ED ADULT TRIAGE NOTE - CHIEF COMPLAINT QUOTE
BIBEMS s/p seizure that lasted 5-10 minutes according to wife. Patient had one similar episode 1/10/22 and had a three day EEG with negative findings. Wife states that he called for her because he started to feel "funny" and she was able to ease him to the floor where he was tonic/clonic. A/Ox4 at this time, denies hitting his head. Not placed on any antiepileptics after first incident.

## 2022-09-16 NOTE — ED PROVIDER NOTE - NS ED ROS FT
Gen: No fever, normal PO intake, no weight changes  Eyes: No eye irritation or discharge  ENT: No ear pain, no congestion, no rhinorrhea, no sore throat  Resp: No cough, no trouble breathing  Cardiovascular: No chest pain, no palpitation  Gastrointestinal: No nausea, no vomiting, no diarrhea, no constipation  :  No change in urine output; no dysuria, no hematuria  MS: No joint or muscle pain  Skin: No rashes  Neuro: (+) headache; no abnormal movements  Remainder negative, except as per the HPI

## 2022-09-16 NOTE — ED PROVIDER NOTE - CLINICAL SUMMARY MEDICAL DECISION MAKING FREE TEXT BOX
Placed on cardiac monitor. Labs, EKG, CT Head. Placed on cardiac monitor. Labs, EKG, CT Head.    Labs reviewed, unremarkable, do not require emergent intervention.   EKG - 89bpm, normal sinus rhythm, normal intervals, no ST elevations/depressions.    CT Head: No acute intracranial bleed, mass effect or depressed skull fracture; as per radiology report.     Neuro/epilepsy consulted.

## 2022-09-16 NOTE — ED ADULT NURSE NOTE - OBJECTIVE STATEMENT
c/o seizure. Pt stated he was at the laundry mat when he felt a sensation in his arm so he called over his wife who assisted his fall, and witnessed him have a seizure for about 5 minutes. Pts wife stated he did not hit his head. PMh of DM and RA, and one episode of seizure in January, pt had full neuro work up with EEG, all negative, so pt discharged without any medication. Pt Aox4, speaking coherently, PERRLA, sensroy intact, able to move all extremities, respirations even and unlabored on RA, skin warm and dry. Pt denies HA, dizziness, SOB, N/V/D, vision changes, CP, palpitations.

## 2022-09-16 NOTE — ED ADULT NURSE NOTE - NSIMPLEMENTINTERV_GEN_ALL_ED
Implemented All Fall Risk Interventions:  [place] to call system. Call bell, personal items and telephone within reach. Instruct patient to call for assistance. Room bathroom lighting operational. Non-slip footwear when patient is off stretcher. Physically safe environment: no spills, clutter or unnecessary equipment. Stretcher in lowest position, wheels locked, appropriate side rails in place. Provide visual cue, wrist band, yellow gown, etc. Monitor gait and stability. Monitor for mental status changes and reorient to person, place, and time. Review medications for side effects contributing to fall risk. Reinforce activity limits and safety measures with patient and family.

## 2022-09-16 NOTE — ED PROVIDER NOTE - PHYSICAL EXAMINATION
Gen: well appearing, no acute distress  Head: normocephalic, atraumatic  EENT: Tongue: (+) erythema to b/l anterior portion. EOMI, PERRLA, neck supple, moist mucous membranes, no scleral icterus  Lung: no increased work of breathing, clear to auscultation bilaterally, no wheezing, rales, rhonchi, speaking in full sentences  CV: regular rate, regular rhythm, normal s1/s2, 2+ radial pulses bilaterally  Abd: soft, non-tender, non-distended, no rebound tenderness or guarding; no CVA tenderness  MSK: No edema, no visible deformities, full range of motion in all 4 extremities  Neuro: Awake, alert, no focal neurologic deficits, normal finger-to-nose. Sensation intact throughout. Strength 5/5 x 4 extremities.   Skin: No rash, no lesions, no jaundice  Psych: normal affect, normal speech

## 2022-09-16 NOTE — ED PROVIDER NOTE - OBJECTIVE STATEMENT
47 year old male with PMHx RA, NIDDM, 1 seizure episode on 1/10/2022 (s/p 3 day EEG, not placed on antiepileptic) presenting for evaluation s/p seizure episode tonight. Patient states was tying a bag of laundry when his right arm suddenly became stiff, so he called his wife for help, shortly after his right leg became stiff, then he had a 15 minute tonic clonic episode witnessed by his wife. She held onto him and lowered him to the ground. Reports brief post ictal state last a few minutes. (+) tongue biting. Denies urinary/bowel incontinence, head trauma, numbness, weakness, tingling, chest pain, difficulty breathing, fevers, chills, cough, recent illness. Currently endorsing mild b/l parietal headache. Patient has not seen epilepsy specialist Dr. Morejon in the last 6 months.

## 2022-09-16 NOTE — ED PROVIDER NOTE - PROGRESS NOTE DETAILS
Cody: Spoke with on-call neurologist Dr. Stratton; advising admission for vEEG. Will hold off on anti-epileptics at this time. Pt agreeable to plan.

## 2022-09-17 DIAGNOSIS — R56.9 UNSPECIFIED CONVULSIONS: ICD-10-CM

## 2022-09-17 LAB
ALBUMIN SERPL ELPH-MCNC: 4.4 G/DL — SIGNIFICANT CHANGE UP (ref 3.3–5.2)
ALP SERPL-CCNC: 147 U/L — HIGH (ref 40–120)
ALT FLD-CCNC: 42 U/L — HIGH
ANION GAP SERPL CALC-SCNC: 13 MMOL/L — SIGNIFICANT CHANGE UP (ref 5–17)
AST SERPL-CCNC: 25 U/L — SIGNIFICANT CHANGE UP
BILIRUB SERPL-MCNC: <0.2 MG/DL — LOW (ref 0.4–2)
BUN SERPL-MCNC: 22.3 MG/DL — HIGH (ref 8–20)
CALCIUM SERPL-MCNC: 9.4 MG/DL — SIGNIFICANT CHANGE UP (ref 8.4–10.5)
CHLORIDE SERPL-SCNC: 98 MMOL/L — SIGNIFICANT CHANGE UP (ref 98–107)
CK MB CFR SERPL CALC: 4.2 NG/ML — SIGNIFICANT CHANGE UP (ref 0–6.7)
CK MB CFR SERPL CALC: 4.8 NG/ML — SIGNIFICANT CHANGE UP (ref 0–6.7)
CK SERPL-CCNC: 198 U/L — SIGNIFICANT CHANGE UP (ref 30–200)
CK SERPL-CCNC: 297 U/L — HIGH (ref 30–200)
CO2 SERPL-SCNC: 24 MMOL/L — SIGNIFICANT CHANGE UP (ref 22–29)
CREAT SERPL-MCNC: 0.7 MG/DL — SIGNIFICANT CHANGE UP (ref 0.5–1.3)
EGFR: 114 ML/MIN/1.73M2 — SIGNIFICANT CHANGE UP
GLUCOSE BLDC GLUCOMTR-MCNC: 196 MG/DL — HIGH (ref 70–99)
GLUCOSE BLDC GLUCOMTR-MCNC: 210 MG/DL — HIGH (ref 70–99)
GLUCOSE BLDC GLUCOMTR-MCNC: 234 MG/DL — HIGH (ref 70–99)
GLUCOSE BLDC GLUCOMTR-MCNC: 246 MG/DL — HIGH (ref 70–99)
GLUCOSE SERPL-MCNC: 234 MG/DL — HIGH (ref 70–99)
MAGNESIUM SERPL-MCNC: 2.2 MG/DL — SIGNIFICANT CHANGE UP (ref 1.6–2.6)
PHOSPHATE SERPL-MCNC: 2.5 MG/DL — SIGNIFICANT CHANGE UP (ref 2.4–4.7)
POTASSIUM SERPL-MCNC: 4 MMOL/L — SIGNIFICANT CHANGE UP (ref 3.5–5.3)
POTASSIUM SERPL-SCNC: 4 MMOL/L — SIGNIFICANT CHANGE UP (ref 3.5–5.3)
PROT SERPL-MCNC: 7 G/DL — SIGNIFICANT CHANGE UP (ref 6.6–8.7)
SARS-COV-2 RNA SPEC QL NAA+PROBE: SIGNIFICANT CHANGE UP
SODIUM SERPL-SCNC: 134 MMOL/L — LOW (ref 135–145)
TROPONIN T SERPL-MCNC: <0.01 NG/ML — SIGNIFICANT CHANGE UP (ref 0–0.06)

## 2022-09-17 PROCEDURE — 70450 CT HEAD/BRAIN W/O DYE: CPT | Mod: 26,MA

## 2022-09-17 PROCEDURE — 99223 1ST HOSP IP/OBS HIGH 75: CPT

## 2022-09-17 PROCEDURE — 93325 DOPPLER ECHO COLOR FLOW MAPG: CPT | Mod: 26

## 2022-09-17 PROCEDURE — 99497 ADVNCD CARE PLAN 30 MIN: CPT | Mod: 25

## 2022-09-17 PROCEDURE — 93320 DOPPLER ECHO COMPLETE: CPT | Mod: 26

## 2022-09-17 PROCEDURE — 93312 ECHO TRANSESOPHAGEAL: CPT | Mod: 26

## 2022-09-17 PROCEDURE — 95720 EEG PHY/QHP EA INCR W/VEEG: CPT

## 2022-09-17 RX ORDER — SODIUM CHLORIDE 9 MG/ML
1000 INJECTION, SOLUTION INTRAVENOUS
Refills: 0 | Status: DISCONTINUED | OUTPATIENT
Start: 2022-09-17 | End: 2022-09-20

## 2022-09-17 RX ORDER — GLUCAGON INJECTION, SOLUTION 0.5 MG/.1ML
1 INJECTION, SOLUTION SUBCUTANEOUS ONCE
Refills: 0 | Status: DISCONTINUED | OUTPATIENT
Start: 2022-09-17 | End: 2022-09-20

## 2022-09-17 RX ORDER — DEXTROSE 50 % IN WATER 50 %
25 SYRINGE (ML) INTRAVENOUS ONCE
Refills: 0 | Status: DISCONTINUED | OUTPATIENT
Start: 2022-09-17 | End: 2022-09-20

## 2022-09-17 RX ORDER — INSULIN LISPRO 100/ML
4 VIAL (ML) SUBCUTANEOUS
Refills: 0 | Status: DISCONTINUED | OUTPATIENT
Start: 2022-09-17 | End: 2022-09-18

## 2022-09-17 RX ORDER — ENOXAPARIN SODIUM 100 MG/ML
40 INJECTION SUBCUTANEOUS EVERY 24 HOURS
Refills: 0 | Status: DISCONTINUED | OUTPATIENT
Start: 2022-09-17 | End: 2022-09-20

## 2022-09-17 RX ORDER — INSULIN LISPRO 100/ML
VIAL (ML) SUBCUTANEOUS
Refills: 0 | Status: DISCONTINUED | OUTPATIENT
Start: 2022-09-17 | End: 2022-09-20

## 2022-09-17 RX ORDER — DEXTROSE 50 % IN WATER 50 %
12.5 SYRINGE (ML) INTRAVENOUS ONCE
Refills: 0 | Status: DISCONTINUED | OUTPATIENT
Start: 2022-09-17 | End: 2022-09-20

## 2022-09-17 RX ORDER — ASPIRIN/CALCIUM CARB/MAGNESIUM 324 MG
81 TABLET ORAL DAILY
Refills: 0 | Status: DISCONTINUED | OUTPATIENT
Start: 2022-09-17 | End: 2022-09-20

## 2022-09-17 RX ORDER — DEXTROSE 50 % IN WATER 50 %
15 SYRINGE (ML) INTRAVENOUS ONCE
Refills: 0 | Status: DISCONTINUED | OUTPATIENT
Start: 2022-09-17 | End: 2022-09-20

## 2022-09-17 RX ORDER — INSULIN GLARGINE 100 [IU]/ML
10 INJECTION, SOLUTION SUBCUTANEOUS AT BEDTIME
Refills: 0 | Status: DISCONTINUED | OUTPATIENT
Start: 2022-09-17 | End: 2022-09-18

## 2022-09-17 RX ORDER — ATORVASTATIN CALCIUM 80 MG/1
40 TABLET, FILM COATED ORAL AT BEDTIME
Refills: 0 | Status: DISCONTINUED | OUTPATIENT
Start: 2022-09-17 | End: 2022-09-20

## 2022-09-17 RX ADMIN — SODIUM CHLORIDE 80 MILLILITER(S): 9 INJECTION, SOLUTION INTRAVENOUS at 21:28

## 2022-09-17 RX ADMIN — Medication 2: at 12:42

## 2022-09-17 RX ADMIN — Medication 4 UNIT(S): at 12:42

## 2022-09-17 RX ADMIN — INSULIN GLARGINE 10 UNIT(S): 100 INJECTION, SOLUTION SUBCUTANEOUS at 21:26

## 2022-09-17 RX ADMIN — ATORVASTATIN CALCIUM 40 MILLIGRAM(S): 80 TABLET, FILM COATED ORAL at 21:26

## 2022-09-17 NOTE — ED ADULT NURSE REASSESSMENT NOTE - NS ED NURSE REASSESS COMMENT FT1
Pt resting comfrotably, AOx4, speaking coherently, PERRLA, sensory intact, able to move all extremities, respirations even and unlabored on RA, skin warm and dry. Pt denies pain, HA, dizziness, SOB, N/V/D, palpitations, CP, chills. Pt TBA. CM and  in place, bed locked in the lowest position, side rails up.

## 2022-09-17 NOTE — CONSULT NOTE ADULT - SUBJECTIVE AND OBJECTIVE BOX
[facility]                                        [facility] [place]                                  Medardo Hoffman, & Tanner                                      [address]                                           [place], [place], [address]                                                [phone]      Covering Epilepsy service for Dr Moreojn.  CC: seizure     HISTORY:  The patient is a 47y Male who had a first seizure on January 10 of 2022. He was evaluated in the ER and discharged to follow up with Dr Morejon.  He saw Dr Morejon in the office on 1/28 and was scheduled for EMU admission.  He was admitted in March of 2022 for cV-EEG monitoring. He had no events for the 3/18 through 3/20 stay and was discharged without antiseizure drugs.  He was well until 9/16 when he presented after another seizure.   Again he reports that it began with right arm stiffening and progressed to the right leg and then to generalized shaking.     Per H&P, the wife reported that the episode lasted 15 minutes and he was confused afterward.     PAST MEDICAL & SURGICAL HISTORY:  DM (diabetes mellitus)  RA (rheumatoid arthritis)    MEDICATION PRIOR TO ADMISSION:  Enbrel   Metformin    MEDICATIONS  (STANDING):  atorvastatin 40 milliGRAM(s) Oral at bedtime  dextrose 5%. 1000 milliLiter(s) (100 mL/Hr) IV Continuous <Continuous>  dextrose 5%. 1000 milliLiter(s) (50 mL/Hr) IV Continuous <Continuous>  enoxaparin Injectable 40 milliGRAM(s) SubCutaneous every 24 hours  glucagon  Injectable 1 milliGRAM(s) IntraMuscular once  insulin glargine Injectable (LANTUS) 10 Unit(s) SubCutaneous at bedtime  insulin lispro (ADMELOG) corrective regimen sliding scale   SubCutaneous three times a day before meals  insulin lispro Injectable (ADMELOG) 4 Unit(s) SubCutaneous three times a day before meals  lactated ringers. 1000 milliLiter(s) (80 mL/Hr) IV Continuous <Continuous>    MEDICATIONS  (PRN):  dextrose Oral Gel 15 Gram(s) Oral once PRN Blood Glucose LESS THAN 70 milliGRAM(s)/deciliter    Allergies  No Known Allergies    SOCIAL HISTORY:  Non smoker.   No alcohol.    FAMILY HISTORY:  No family history of seizure.     ROS:  Constitutional: The patient denies fevers or weight changes.  Neuro: As per HPI.  Eyes: Denies blurry vision.  Ears/nose/throat: Denies Tinnitus.   Cardiac: Denies chest pain. Denies palpitations.  Respiratory: Denies shortness of breath.  GI: Denies abdominal pain, nausea, or vomiting.  : Denies change in urinary pattern.  Integumentary: Denies rash.  Psych: Denies recent mood changes.  Heme: denies easy bleeding/bruising.    Exam:  Vital Signs Last 24 Hrs  T(C): 36.6 (17 Sep 2022 11:14), Max: 37 (16 Sep 2022 21:57)  T(F): 97.8 (17 Sep 2022 11:14), Max: 98.6 (16 Sep 2022 21:57)  HR: 75 (17 Sep 2022 11:14) (74 - 105)  BP: 120/76 (17 Sep 2022 11:14) (120/76 - 144/89)  RR: 18 (17 Sep 2022 11:14) (16 - 20)  SpO2: 97% (17 Sep 2022 11:14) (95% - 100%)    Parameters below as of 17 Sep 2022 11:14  Patient On (Oxygen Delivery Method): room air    General: NAD.   Carotid bruits absent.     Mental status: The patient is awake, alert, and fully oriented. There is no aphasia. Attention span is normal. Patient is aware of current events.     Cranial nerves: There is no papilledema. Pupils react symmetrically to light. There is no visual field deficit to confrontation. Extraocular motion is full with no nystagmus.  Facial sensation is intact. Facial musculature is symmetric. Palate elevates symmetrically. Tongue is midline.    Motor: There is normal bulk and tone.  Strength is 5/5 in the right arm and leg.   Strength is 5/5 in the left arm and leg.    Sensation: Intact to light touch and pin. There is no extinction to double simultaneous stimulation.    Reflexes: 2+ throughout and plantar responses are flexor.    Cerebellar: There is no dysmetria on finger to nose testing.    LABS:                         15.7   9.90  )-----------( 232      ( 16 Sep 2022 23:10 )             45.9       09-16    134<L>  |  98  |  22.3<H>  ----------------------------<  234<H>  4.0   |  24.0  |  0.70    Ca    9.4      16 Sep 2022 23:10  Phos  2.5     09-17  Mg     2.2     09-17    TPro  7.0  /  Alb  4.4  /  TBili  <0.2<L>  /  DBili  x   /  AST  25  /  ALT  42<H>  /  AlkPhos  147<H>  09-16      RADIOLOGY   CT head images reviewed (and concur with report): There is no acute pathology.     Brain MRI in February of 2022 shows a chronic lacunar infarct in the left basal ganglia and a single coarse calcification along the left parietal lobe.                                       [facility]                                        Neurology at [place]                                  Medardo Hoffman, & Tanner                                      [address]                                           [place], [place], [address]                                                [phone]      Covering Epilepsy service for Dr Morejon.  CC: seizure     HISTORY:  The patient is a 47y Male who had a first seizure on January 10 of 2022. He was evaluated in the ER and discharged to follow up with Dr Morejon.  He saw Dr Morejon in the office on 1/28 and was scheduled for EMU admission.  He was admitted in March of 2022 for cV-EEG monitoring. He had no events for the 3/18 through 3/20 stay and was discharged without antiseizure drugs.  He was well until 9/16 when he presented after another seizure.   Again he reports that it began with right arm stiffening and progressed to the right leg and then to generalized shaking.     Per H&P, the wife reported that the episode lasted 15 minutes and he was confused afterward.     PAST MEDICAL & SURGICAL HISTORY:  DM (diabetes mellitus)  RA (rheumatoid arthritis)    MEDICATION PRIOR TO ADMISSION:  Enbrel   Metformin    MEDICATIONS  (STANDING):  atorvastatin 40 milliGRAM(s) Oral at bedtime  dextrose 5%. 1000 milliLiter(s) (100 mL/Hr) IV Continuous <Continuous>  dextrose 5%. 1000 milliLiter(s) (50 mL/Hr) IV Continuous <Continuous>  enoxaparin Injectable 40 milliGRAM(s) SubCutaneous every 24 hours  glucagon  Injectable 1 milliGRAM(s) IntraMuscular once  insulin glargine Injectable (LANTUS) 10 Unit(s) SubCutaneous at bedtime  insulin lispro (ADMELOG) corrective regimen sliding scale   SubCutaneous three times a day before meals  insulin lispro Injectable (ADMELOG) 4 Unit(s) SubCutaneous three times a day before meals  lactated ringers. 1000 milliLiter(s) (80 mL/Hr) IV Continuous <Continuous>    MEDICATIONS  (PRN):  dextrose Oral Gel 15 Gram(s) Oral once PRN Blood Glucose LESS THAN 70 milliGRAM(s)/deciliter    Allergies  No Known Allergies    SOCIAL HISTORY:  Non smoker.   No alcohol.    FAMILY HISTORY:  No family history of seizure.     ROS:  Constitutional: The patient denies fevers or weight changes.  Neuro: As per HPI.  Eyes: Denies blurry vision.  Ears/nose/throat: Denies Tinnitus.   Cardiac: Denies chest pain. Denies palpitations.  Respiratory: Denies shortness of breath.  GI: Denies abdominal pain, nausea, or vomiting.  : Denies change in urinary pattern.  Integumentary: Denies rash.  Psych: Denies recent mood changes.  Heme: denies easy bleeding/bruising.    Exam:  Vital Signs Last 24 Hrs  T(C): 36.6 (17 Sep 2022 11:14), Max: 37 (16 Sep 2022 21:57)  T(F): 97.8 (17 Sep 2022 11:14), Max: 98.6 (16 Sep 2022 21:57)  HR: 75 (17 Sep 2022 11:14) (74 - 105)  BP: 120/76 (17 Sep 2022 11:14) (120/76 - 144/89)  RR: 18 (17 Sep 2022 11:14) (16 - 20)  SpO2: 97% (17 Sep 2022 11:14) (95% - 100%)    Parameters below as of 17 Sep 2022 11:14  Patient On (Oxygen Delivery Method): room air    General: NAD.   Carotid bruits absent.     Mental status: The patient is awake, alert, and fully oriented. There is no aphasia. Attention span is normal. Patient is aware of current events.     Cranial nerves: There is no papilledema. Pupils react symmetrically to light. There is no visual field deficit to confrontation. Extraocular motion is full with no nystagmus.  Facial sensation is intact. Facial musculature is symmetric. Palate elevates symmetrically. Tongue is midline.    Motor: There is normal bulk and tone.  Strength is 5/5 in the right arm and leg.   Strength is 5/5 in the left arm and leg.    Sensation: Intact to light touch and pin. There is no extinction to double simultaneous stimulation.    Reflexes: 2+ throughout and plantar responses are flexor.    Cerebellar: There is no dysmetria on finger to nose testing.    LABS:                         15.7   9.90  )-----------( 232      ( 16 Sep 2022 23:10 )             45.9       09-16    134<L>  |  98  |  22.3<H>  ----------------------------<  234<H>  4.0   |  24.0  |  0.70    Ca    9.4      16 Sep 2022 23:10  Phos  2.5     09-17  Mg     2.2     09-17    TPro  7.0  /  Alb  4.4  /  TBili  <0.2<L>  /  DBili  x   /  AST  25  /  ALT  42<H>  /  AlkPhos  147<H>  09-16      RADIOLOGY   CT head images reviewed (and concur with report): There is no acute pathology. There is a small calcification in the left parieto-occipital region.     Brain MRI in February of 2022 shows a chronic lacunar infarct in the left basal ganglia and a single coarse calcification along the left parietal lobe.

## 2022-09-17 NOTE — GOALS OF CARE CONVERSATION - ADVANCED CARE PLANNING - CONVERSATION DETAILS
discussed the purpose of making GoC decisions    current diagnosis and prognosis    after discussion, patient and spouse clearly state his wishes to be fully resuscitated if needed    Remains FULL CODE

## 2022-09-17 NOTE — H&P ADULT - HISTORY OF PRESENT ILLNESS
47 year old male with PMHx RA, NIDDM, presenting for evaluation s/p seizure episode last night. Patient states was tying a bag of laundry when his right arm suddenly became stiff, so he called his wife for help, shortly after his right leg became stiff, then he had a 15 minute tonic clonic episode witnessed by his wife. She held onto him and lowered him to the ground. Reports brief post ictal state last a few minutes. (+) tongue biting. Denies urinary/bowel incontinence, head trauma, numbness, weakness, tingling, chest pain, difficulty breathing, fevers, chills, cough, recent illness. Currently endorsing mild b/l parietal headache.   He was here for vEEG in March 2022. Did not need AED. He has not seen Dr. Morejon in the last 6 months. Pt was advised not to drive until seizure free for 1 year in accordance with [facility] law    SH- lives with family, Denies any toxic habits  FH- DM2

## 2022-09-17 NOTE — ED ADULT NURSE REASSESSMENT NOTE - NS ED NURSE REASSESS COMMENT FT1
Pt resting comfortably, sensory intact, PERRLA, able to move al extremities, AOx4, speaking coherently, respirations even and unlabored on RA, skin warm and dry. Pt awaiting neuro cs. CM and  in place, bed locked in the lowest position, side rails up.

## 2022-09-17 NOTE — CONSULT NOTE ADULT - ASSESSMENT
The patient is a 47y Male with a second unprovoked seizure.    Admit for v-EEG monitoring.   Will need at least 48-72 hrs monitoring for events.   Will hold off on antiseizure drug for now.  If patient has event on monitor then antiseizure drug will depend upon EEG during event.      Stroke on MRI.  As patient is only 47 with only risk factor of Diabetes Mellitus, would suggest cardiology evaluation for eventual NUPUR and loop.   LDL: 112  Goal: < 70  Agree with statin.   Begin Aspirin.     Case discussed with Dr Qureshi.    The patient is a 47y Male with a second unprovoked seizure.    Admit for v-EEG monitoring.   Will need at least 48-72 hrs monitoring for events.   Will hold off on antiseizure drug for now.  If patient has event on monitor then antiseizure drug will depend upon EEG during event.    If no events then would still require antiseizure drug as this is now a second unprovoked episode.   Given that there is a cortical calcification in the left parietal lobe and seizure begin in right arm and spread to leg before generalizing, further monitoring (presurgical) may be indicated if patient does not respond to medication.     Stroke on MRI.  As patient is only 47 with only risk factor of Diabetes Mellitus, would suggest cardiology evaluation for eventual NUPUR and loop.   LDL: 112  Goal: < 70  Agree with statin.   Begin Aspirin.     Case discussed with Dr Qureshi.

## 2022-09-18 DIAGNOSIS — I63.9 CEREBRAL INFARCTION, UNSPECIFIED: ICD-10-CM

## 2022-09-18 LAB
A1C WITH ESTIMATED AVERAGE GLUCOSE RESULT: 10.1 % — HIGH (ref 4–5.6)
ALBUMIN SERPL ELPH-MCNC: 4 G/DL — SIGNIFICANT CHANGE UP (ref 3.3–5.2)
ALP SERPL-CCNC: 116 U/L — SIGNIFICANT CHANGE UP (ref 40–120)
ALT FLD-CCNC: 34 U/L — SIGNIFICANT CHANGE UP
AMPHET UR-MCNC: NEGATIVE — SIGNIFICANT CHANGE UP
ANION GAP SERPL CALC-SCNC: 12 MMOL/L — SIGNIFICANT CHANGE UP (ref 5–17)
AST SERPL-CCNC: 16 U/L — SIGNIFICANT CHANGE UP
BARBITURATES UR SCN-MCNC: NEGATIVE — SIGNIFICANT CHANGE UP
BENZODIAZ UR-MCNC: NEGATIVE — SIGNIFICANT CHANGE UP
BILIRUB SERPL-MCNC: 0.3 MG/DL — LOW (ref 0.4–2)
BUN SERPL-MCNC: 18.3 MG/DL — SIGNIFICANT CHANGE UP (ref 8–20)
CALCIUM SERPL-MCNC: 9.1 MG/DL — SIGNIFICANT CHANGE UP (ref 8.4–10.5)
CHLORIDE SERPL-SCNC: 100 MMOL/L — SIGNIFICANT CHANGE UP (ref 98–107)
CO2 SERPL-SCNC: 25 MMOL/L — SIGNIFICANT CHANGE UP (ref 22–29)
COCAINE METAB.OTHER UR-MCNC: NEGATIVE — SIGNIFICANT CHANGE UP
CREAT SERPL-MCNC: 0.91 MG/DL — SIGNIFICANT CHANGE UP (ref 0.5–1.3)
EGFR: 104 ML/MIN/1.73M2 — SIGNIFICANT CHANGE UP
ESTIMATED AVERAGE GLUCOSE: 243 MG/DL — HIGH (ref 68–114)
GLUCOSE BLDC GLUCOMTR-MCNC: 156 MG/DL — HIGH (ref 70–99)
GLUCOSE BLDC GLUCOMTR-MCNC: 161 MG/DL — HIGH (ref 70–99)
GLUCOSE BLDC GLUCOMTR-MCNC: 173 MG/DL — HIGH (ref 70–99)
GLUCOSE BLDC GLUCOMTR-MCNC: 187 MG/DL — HIGH (ref 70–99)
GLUCOSE SERPL-MCNC: 202 MG/DL — HIGH (ref 70–99)
MAGNESIUM SERPL-MCNC: 2.1 MG/DL — SIGNIFICANT CHANGE UP (ref 1.6–2.6)
METHADONE UR-MCNC: NEGATIVE — SIGNIFICANT CHANGE UP
OPIATES UR-MCNC: NEGATIVE — SIGNIFICANT CHANGE UP
PCP SPEC-MCNC: SIGNIFICANT CHANGE UP
PCP UR-MCNC: NEGATIVE — SIGNIFICANT CHANGE UP
PHOSPHATE SERPL-MCNC: 3.4 MG/DL — SIGNIFICANT CHANGE UP (ref 2.4–4.7)
POTASSIUM SERPL-MCNC: 3.9 MMOL/L — SIGNIFICANT CHANGE UP (ref 3.5–5.3)
POTASSIUM SERPL-SCNC: 3.9 MMOL/L — SIGNIFICANT CHANGE UP (ref 3.5–5.3)
PROT SERPL-MCNC: 6.4 G/DL — LOW (ref 6.6–8.7)
SODIUM SERPL-SCNC: 137 MMOL/L — SIGNIFICANT CHANGE UP (ref 135–145)
THC UR QL: NEGATIVE — SIGNIFICANT CHANGE UP

## 2022-09-18 PROCEDURE — 95720 EEG PHY/QHP EA INCR W/VEEG: CPT

## 2022-09-18 PROCEDURE — 99223 1ST HOSP IP/OBS HIGH 75: CPT

## 2022-09-18 PROCEDURE — 99233 SBSQ HOSP IP/OBS HIGH 50: CPT

## 2022-09-18 PROCEDURE — 99222 1ST HOSP IP/OBS MODERATE 55: CPT

## 2022-09-18 PROCEDURE — 99232 SBSQ HOSP IP/OBS MODERATE 35: CPT

## 2022-09-18 RX ORDER — LEVETIRACETAM 250 MG/1
750 TABLET, FILM COATED ORAL
Refills: 0 | Status: DISCONTINUED | OUTPATIENT
Start: 2022-09-19 | End: 2022-09-20

## 2022-09-18 RX ORDER — INSULIN GLARGINE 100 [IU]/ML
15 INJECTION, SOLUTION SUBCUTANEOUS AT BEDTIME
Refills: 0 | Status: DISCONTINUED | OUTPATIENT
Start: 2022-09-18 | End: 2022-09-20

## 2022-09-18 RX ORDER — INSULIN LISPRO 100/ML
6 VIAL (ML) SUBCUTANEOUS
Refills: 0 | Status: DISCONTINUED | OUTPATIENT
Start: 2022-09-18 | End: 2022-09-19

## 2022-09-18 RX ORDER — LEVETIRACETAM 250 MG/1
1500 TABLET, FILM COATED ORAL ONCE
Refills: 0 | Status: COMPLETED | OUTPATIENT
Start: 2022-09-19 | End: 2022-09-19

## 2022-09-18 RX ADMIN — Medication 81 MILLIGRAM(S): at 12:09

## 2022-09-18 RX ADMIN — Medication 4 UNIT(S): at 12:11

## 2022-09-18 RX ADMIN — Medication 6 UNIT(S): at 16:13

## 2022-09-18 RX ADMIN — SODIUM CHLORIDE 80 MILLILITER(S): 9 INJECTION, SOLUTION INTRAVENOUS at 21:06

## 2022-09-18 RX ADMIN — Medication 4 UNIT(S): at 08:08

## 2022-09-18 RX ADMIN — INSULIN GLARGINE 15 UNIT(S): 100 INJECTION, SOLUTION SUBCUTANEOUS at 22:19

## 2022-09-18 RX ADMIN — Medication 2: at 12:08

## 2022-09-18 RX ADMIN — SODIUM CHLORIDE 80 MILLILITER(S): 9 INJECTION, SOLUTION INTRAVENOUS at 08:10

## 2022-09-18 RX ADMIN — Medication 2: at 16:13

## 2022-09-18 RX ADMIN — ATORVASTATIN CALCIUM 40 MILLIGRAM(S): 80 TABLET, FILM COATED ORAL at 21:06

## 2022-09-18 RX ADMIN — Medication 2: at 08:07

## 2022-09-18 RX ADMIN — ENOXAPARIN SODIUM 40 MILLIGRAM(S): 100 INJECTION SUBCUTANEOUS at 12:09

## 2022-09-18 NOTE — PROGRESS NOTE ADULT - SUBJECTIVE AND OBJECTIVE BOX
[facility] Division of Hospital Medicine    Chief Complaint: Seizure      SUBJECTIVE / OVERNIGHT EVENTS:  Pt reports he feels well  No episodes since admission     Patient denies chest pain, SOB, abd pain, N/V, fever, chills, dysuria or any other complaints. All remainder ROS negative.     MEDICATIONS  (STANDING):  aspirin enteric coated 81 milliGRAM(s) Oral daily  atorvastatin 40 milliGRAM(s) Oral at bedtime  dextrose 5%. 1000 milliLiter(s) (100 mL/Hr) IV Continuous <Continuous>  dextrose 5%. 1000 milliLiter(s) (50 mL/Hr) IV Continuous <Continuous>  dextrose 50% Injectable 25 Gram(s) IV Push once  dextrose 50% Injectable 12.5 Gram(s) IV Push once  dextrose 50% Injectable 25 Gram(s) IV Push once  enoxaparin Injectable 40 milliGRAM(s) SubCutaneous every 24 hours  glucagon  Injectable 1 milliGRAM(s) IntraMuscular once  insulin glargine Injectable (LANTUS) 10 Unit(s) SubCutaneous at bedtime  insulin lispro (ADMELOG) corrective regimen sliding scale   SubCutaneous three times a day before meals  insulin lispro Injectable (ADMELOG) 4 Unit(s) SubCutaneous three times a day before meals  lactated ringers. 1000 milliLiter(s) (80 mL/Hr) IV Continuous <Continuous>    MEDICATIONS  (PRN):  dextrose Oral Gel 15 Gram(s) Oral once PRN Blood Glucose LESS THAN 70 milliGRAM(s)/deciliter        I&O's Summary    17 Sep 2022 07:01  -  18 Sep 2022 07:00  --------------------------------------------------------  IN: 720 mL / OUT: 0 mL / NET: 720 mL        PHYSICAL EXAM:  Vital Signs Last 24 Hrs  T(C): 36.7 (18 Sep 2022 10:03), Max: 36.8 (17 Sep 2022 15:28)  T(F): 98 (18 Sep 2022 10:03), Max: 98.3 (17 Sep 2022 20:39)  HR: 71 (18 Sep 2022 10:03) (63 - 75)  BP: 120/74 (18 Sep 2022 10:03) (120/74 - 142/81)  BP(mean): 100 (17 Sep 2022 15:28) (100 - 100)  RR: 18 (18 Sep 2022 10:03) (18 - 18)  SpO2: 96% (18 Sep 2022 10:03) (95% - 96%)    Parameters below as of 18 Sep 2022 10:03  Patient On (Oxygen Delivery Method): room air          CONSTITUTIONAL: NAD, sitting up in bed  ENMT: Moist oral mucosa, EOMI, bite marks on tongue   RESPIRATORY: Normal respiratory effort; lungs are clear to auscultation bilaterally  CARDIOVASCULAR: Regular rate and rhythm, normal S1 and S2, No lower extremity edema  ABDOMEN: Nontender to palpation, normoactive bowel sounds  MUSCULOSKELETAL:  No clubbing or cyanosis of digits   PSYCH: A+O to person, place, and time; affect appropriate  NEUROLOGY: No gross sensory or motor deficits   SKIN: Warm and dry       LABS:                        15.7   9.90  )-----------( 232      ( 16 Sep 2022 23:10 )             45.9     09-18    137  |  100  |  18.3  ----------------------------<  202<H>  3.9   |  25.0  |  0.91    Ca    9.1      18 Sep 2022 04:40  Phos  3.4     09-18  Mg     2.1     09-18    TPro  6.4<L>  /  Alb  4.0  /  TBili  0.3<L>  /  DBili  x   /  AST  16  /  ALT  34  /  AlkPhos  116  09-18      CARDIAC MARKERS ( 17 Sep 2022 08:53 )  x     / x     / 297 U/L / x     / 4.8 ng/mL  CARDIAC MARKERS ( 16 Sep 2022 23:10 )  x     / <0.01 ng/mL / 198 U/L / x     / 4.2 ng/mL          CAPILLARY BLOOD GLUCOSE      POCT Blood Glucose.: 173 mg/dL (18 Sep 2022 12:06)  POCT Blood Glucose.: 187 mg/dL (18 Sep 2022 08:05)  POCT Blood Glucose.: 246 mg/dL (17 Sep 2022 21:25)  POCT Blood Glucose.: 210 mg/dL (17 Sep 2022 18:46)  POCT Blood Glucose.: 234 mg/dL (17 Sep 2022 17:26)

## 2022-09-18 NOTE — EEG REPORT - NS EEG TEXT BOX
YEMI ANAYA [facility]-[id number]     Study Date: 		09-17-22 10:42 to 09-18-22 08:00  Duration x Hours: 20:19 hrs  --------------------------------------------------------------------------------------------------  History:  CC/ HPI Patient is a 47y old  Male who presents with a chief complaint of Sz (17 Sep 2022 11:33)    MEDICATIONS  (STANDING):  aspirin enteric coated 81 milliGRAM(s) Oral daily  atorvastatin 40 milliGRAM(s) Oral at bedtime  dextrose 5%. 1000 milliLiter(s) (100 mL/Hr) IV Continuous <Continuous>  dextrose 5%. 1000 milliLiter(s) (50 mL/Hr) IV Continuous <Continuous>  dextrose 50% Injectable 25 Gram(s) IV Push once  dextrose 50% Injectable 12.5 Gram(s) IV Push once  dextrose 50% Injectable 25 Gram(s) IV Push once  enoxaparin Injectable 40 milliGRAM(s) SubCutaneous every 24 hours  glucagon  Injectable 1 milliGRAM(s) IntraMuscular once  insulin glargine Injectable (LANTUS) 10 Unit(s) SubCutaneous at bedtime  insulin lispro (ADMELOG) corrective regimen sliding scale   SubCutaneous three times a day before meals  insulin lispro Injectable (ADMELOG) 4 Unit(s) SubCutaneous three times a day before meals  lactated ringers. 1000 milliLiter(s) (80 mL/Hr) IV Continuous <Continuous>    --------------------------------------------------------------------------------------------------  Study Interpretation:    [[[Abbreviation Key:  PDR=alpha rhythm/posterior dominant rhythm. A-P=anterior posterior.  Amplitude: ‘very low’:<20; ‘low’:20-49; ‘medium’:; ‘high’:>150uV.  Persistence for periodic/rhythmic patterns (% of epoch) ‘rare’:<1%; ‘occasional’:1-10%; ‘frequent’:10-50%; ‘abundant’:50-90%; ‘continuous’:>90%.  Persistence for sporadic discharges: ‘rare’:<1/hr; ‘occasional’:1/min-1/hr; ‘frequent’:>1/min; ‘abundant’:>1/10 sec.  RPP=rhythmic and periodic patterns; GRDA=generalized rhythmic delta activity; FIRDA=frontal intermittent GRDA; LRDA=lateralized rhythmic delta activity; TIRDA=temporal intermittent rhythmic delta activity;  LPD=PLED=lateralized periodic discharges; GPD=generalized periodic discharges; BIPDs =bilateral independent periodic discharges; Mf=multifocal; SIRPDs=stimulus induced rhythmic, periodic, or ictal appearing discharges; BIRDs=brief potentially ictal rhythmic discharges >4 Hz, lasting .5-10s; PFA (paroxysmal bursts >13 Hz or =8 Hz <10s).  Modifiers: +F=with fast component; +S=with spike component; +R=with rhythmic component.  S-B=burst suppression pattern.  Max=maximal. N1-drowsy; N2-stage II sleep; N3-slow wave sleep. SSS/BETS=small sharp spikes/benign epileptiform transients of sleep. HV=hyperventilation; PS=photic stimulation]]]    Daily EEG Visual Analysis    FINDINGS:      Background:  Continuous: continuous  Symmetry: symmetric  PDR: 10 Hz activity, with amplitude to 40 uV, that attenuated to eye opening.  Low amplitude frontal beta noted in wakefulness.  Reactivity: present  Voltage: normal, mostly 20-150uV  Anterior Posterior Gradient: present  Other background findings: none  Breach: absent    Background Slowing:  Generalized slowing: none was present.  Focal slowing: none was present.    State Changes:   -Drowsiness noted with increased slowing, attenuation of fast activity, vertex transients.  -Present with N2 sleep transients with symmetric spindles and K-complexes.    Sporadic Epileptiform Discharges:    None    Rhythmic and Periodic Patterns (RPPs):  None     Electrographic and Electroclinical seizures:  None    Other Clinical Events:  None    Activation Procedures:   -Hyperventilation was not performed.    -Photic stimulation was performed and did not elicit any abnormalities.      Artifacts:  Intermittent myogenic and movement artifacts were noted.    ECG:  The heart rate on single channel ECG was predominantly between 60-70 BPM.    EEG Classification / Summary:  Normal EEG in the awake / drowsy / asleep state(s).    Clinical Impression:  Normal VEEG  There were no epileptiform abnormalities recorded.      This is a prelim report only, pending review with attending prior to finalization.      -------------------------------------------------------------------------------------------------------  [facility] EEG Reading Room Ph#: [phone]  Epilepsy Answering Service after 5PM and before 8:30AM: Ph#: [phone]    Ezekiel Ramirez M.D.   Epilepsy fellow YEMI ANAYA [facility]-[id number]     Study Date: 		09-17-22 10:42 to 09-18-22 08:00  Duration x Hours: 20:19 hrs  --------------------------------------------------------------------------------------------------  History:  CC/ HPI Patient is a 47y old  Male who presents with a chief complaint of Sz (17 Sep 2022 11:33)    MEDICATIONS  (STANDING):  aspirin enteric coated 81 milliGRAM(s) Oral daily  atorvastatin 40 milliGRAM(s) Oral at bedtime  dextrose 5%. 1000 milliLiter(s) (100 mL/Hr) IV Continuous <Continuous>  dextrose 5%. 1000 milliLiter(s) (50 mL/Hr) IV Continuous <Continuous>  dextrose 50% Injectable 25 Gram(s) IV Push once  dextrose 50% Injectable 12.5 Gram(s) IV Push once  dextrose 50% Injectable 25 Gram(s) IV Push once  enoxaparin Injectable 40 milliGRAM(s) SubCutaneous every 24 hours  glucagon  Injectable 1 milliGRAM(s) IntraMuscular once  insulin glargine Injectable (LANTUS) 10 Unit(s) SubCutaneous at bedtime  insulin lispro (ADMELOG) corrective regimen sliding scale   SubCutaneous three times a day before meals  insulin lispro Injectable (ADMELOG) 4 Unit(s) SubCutaneous three times a day before meals  lactated ringers. 1000 milliLiter(s) (80 mL/Hr) IV Continuous <Continuous>    --------------------------------------------------------------------------------------------------  Study Interpretation:    [[[Abbreviation Key:  PDR=alpha rhythm/posterior dominant rhythm. A-P=anterior posterior.  Amplitude: ‘very low’:<20; ‘low’:20-49; ‘medium’:; ‘high’:>150uV.  Persistence for periodic/rhythmic patterns (% of epoch) ‘rare’:<1%; ‘occasional’:1-10%; ‘frequent’:10-50%; ‘abundant’:50-90%; ‘continuous’:>90%.  Persistence for sporadic discharges: ‘rare’:<1/hr; ‘occasional’:1/min-1/hr; ‘frequent’:>1/min; ‘abundant’:>1/10 sec.  RPP=rhythmic and periodic patterns; GRDA=generalized rhythmic delta activity; FIRDA=frontal intermittent GRDA; LRDA=lateralized rhythmic delta activity; TIRDA=temporal intermittent rhythmic delta activity;  LPD=PLED=lateralized periodic discharges; GPD=generalized periodic discharges; BIPDs =bilateral independent periodic discharges; Mf=multifocal; SIRPDs=stimulus induced rhythmic, periodic, or ictal appearing discharges; BIRDs=brief potentially ictal rhythmic discharges >4 Hz, lasting .5-10s; PFA (paroxysmal bursts >13 Hz or =8 Hz <10s).  Modifiers: +F=with fast component; +S=with spike component; +R=with rhythmic component.  S-B=burst suppression pattern.  Max=maximal. N1-drowsy; N2-stage II sleep; N3-slow wave sleep. SSS/BETS=small sharp spikes/benign epileptiform transients of sleep. HV=hyperventilation; PS=photic stimulation]]]    Daily EEG Visual Analysis    FINDINGS:      Background:  Continuous: continuous  Symmetry: symmetric  PDR: 10 Hz activity, with amplitude to 40 uV, that attenuated to eye opening.  Low amplitude frontal beta noted in wakefulness.  Reactivity: present  Voltage: normal, mostly 20-150uV  Anterior Posterior Gradient: present  Other background findings: none  Breach: absent    Background Slowing:  Generalized slowing: none was present.  Focal slowing: none was present.    State Changes:   -Drowsiness noted with increased slowing, attenuation of fast activity, vertex transients.  -Present with N2 sleep transients with symmetric spindles and K-complexes.    Sporadic Epileptiform Discharges:    None    Rhythmic and Periodic Patterns (RPPs):  None     Electrographic and Electroclinical seizures:  None    Other Clinical Events:  None    Activation Procedures:   -Hyperventilation was not performed.    -Photic stimulation was performed and did not elicit any abnormalities.      Artifacts:  Intermittent myogenic and movement artifacts were noted.    ECG:  The heart rate on single channel ECG was predominantly between 60-70 BPM.    EEG Classification / Summary:  Normal EEG in the awake / drowsy / asleep state(s).    Clinical Impression:  Normal VEEG  There were no epileptiform abnormalities recorded.            -------------------------------------------------------------------------------------------------------  [facility] EEG Reading Room Ph#: [phone]  Epilepsy Answering Service after 5PM and before 8:30AM: Ph#: [phone]    Ezekiel Ramirez M.D.   Epilepsy fellow

## 2022-09-18 NOTE — CONSULT NOTE ADULT - PROBLEM SELECTOR RECOMMENDATION 9
- pt had MRI in february which was done due to seizure at that time showing chronic lacunar infarct left lentiform nucleus and very mild small vessel ischemic changes, but no evidence of acute ischemia   - CT head is negative  - pt presenting with seizures again, did not follow up outpatient  - Cardiology consulted to r/o cardioembolic stroke   - Patient denies all cardiac etiologies, and does not have typical risk factors.   - Denies palpitations and afib, has been on telemetry previously and now and there is no evidence of afib on telemetry   - neuro workup in progress, so far unrevealing CT head and EEG. Consider repeat MRI head  - will check NUPUR in AM r/o cardioembolic stroke and possible ILR before discharge   - keep NPO @ [facility]

## 2022-09-18 NOTE — PROGRESS NOTE ADULT - SUBJECTIVE AND OBJECTIVE BOX
[facility]                                        [facility] [place]                                 [facility]                                  [address]                                        [place], [place], [address]                                             [phone]        CC: seizure     HPI:   The patient is a 47y Male who had a first seizure on January 10 of 2022. He was evaluated in the ER and discharged to follow up with Dr Morejon.  He saw Dr Morejon in the office on 1/28 and was scheduled for EMU admission.  He was admitted in March of 2022 for cV-EEG monitoring. He had no events for the 3/18 through 3/20 stay and was discharged without antiseizure drugs.  He was well until 9/16 when he presented after another seizure.   Again he reports that it began with right arm stiffening and progressed to the right leg and then to generalized shaking.     Per H&P, the wife reported that the episode lasted 15 minutes and he was confused afterward.     Interim history:  Now on [address] [place].     ROS:   Denies headache or dizziness.  Denies chest pain.  Denies shortness of breath.    MEDICATIONS  (STANDING):  aspirin enteric coated 81 milliGRAM(s) Oral daily  atorvastatin 40 milliGRAM(s) Oral at bedtime  dextrose 5%. 1000 milliLiter(s) (100 mL/Hr) IV Continuous <Continuous>  dextrose 5%. 1000 milliLiter(s) (50 mL/Hr) IV Continuous <Continuous>  enoxaparin Injectable 40 milliGRAM(s) SubCutaneous every 24 hours  glucagon  Injectable 1 milliGRAM(s) IntraMuscular once  insulin glargine Injectable (LANTUS) 10 Unit(s) SubCutaneous at bedtime  insulin lispro (ADMELOG) corrective regimen sliding scale   SubCutaneous three times a day before meals  insulin lispro Injectable (ADMELOG) 4 Unit(s) SubCutaneous three times a day before meals  lactated ringers. 1000 milliLiter(s) (80 mL/Hr) IV Continuous <Continuous>    Vital Signs Last 24 Hrs  T(C): 36.7 (18 Sep 2022 10:03), Max: 36.8 (17 Sep 2022 15:28)  T(F): 98 (18 Sep 2022 10:03), Max: 98.3 (17 Sep 2022 20:39)  HR: 71 (18 Sep 2022 10:03) (63 - 75)  BP: 120/74 (18 Sep 2022 10:03) (120/74 - 142/81)  BP(mean): 100 (17 Sep 2022 15:28) (100 - 100)  RR: 18 (18 Sep 2022 10:03) (18 - 18)  SpO2: 96% (18 Sep 2022 10:03) (95% - 96%)    Parameters below as of 18 Sep 2022 10:03  Patient On (Oxygen Delivery Method): room air    Detailed Neurologic Exam:    Mental status: The patient is awake and alert. There is no aphasia. There is no dysarthria.     Cranial nerves: Pupils equal and react symmetrically to light. There is no visual field deficit to threat. Extraocular motion is full with no nystagmus. Facial sensation is intact. Facial musculature is symmetric. Palate elevates symmetrically. Tongue is midline.    Motor: There is normal bulk and tone.  There is no tremor.  Strength grossly 5/5 bilaterally.    Sensation: Grossly intact to light touch and pin.    Reflexes: 2+ throughout and plantar responses are flexor.    Cerebellar: No dysmetria on finger nose testing.    Labs:     09-18    137  |  100  |  18.3  ----------------------------<  202<H>  3.9   |  25.0  |  0.91    Ca    9.1      18 Sep 2022 04:40  Phos  3.4     09-18  Mg     2.1     09-18    TPro  6.4<L>  /  Alb  4.0  /  TBili  0.3<L>  /  DBili  x   /  AST  16  /  ALT  34  /  AlkPhos  116  09-18                            15.7   9.90  )-----------( 232      ( 16 Sep 2022 23:10 )             45.9       Rad:   Brain MRI in February of 2022 shows a chronic lacunar infarct in the left basal ganglia and a single coarse calcification along the left parietal lobe.     EEG thus far normal.

## 2022-09-18 NOTE — CONSULT NOTE ADULT - ASSESSMENT
47 year old male with PMHx HLD, RA, NIDDM, presenting for evaluation s/p seizure episode. Pt has MRI earlier this year that showed chronic lacunar infarct left lentiform nucleus and very mild small vessel ischemic changes.  1. Uncontrolled T2DM complicated by CVA   - discussed with pt importance of good glycemic control  - increase Lantus to 15 units  - increase Admelog to 6 units premeals  - cont Admelog scale  - will need insulin therapy upon discharge, may also resume MFN on discharge if renal fxm ok  - diabetic education (glucometer, insulin teaching and diet) prior to discharge  -  outpt follow up for diabetes management    2. HLD   cont statin    3. CVA  - cont aspirin, statin  - cardiac work up in progress  - needs amilcar glycemic control

## 2022-09-18 NOTE — CONSULT NOTE ADULT - SUBJECTIVE AND OBJECTIVE BOX
HPI:  47 year old male with PMHx RA, NIDDM, presenting for evaluation s/p seizure episode last night. Patient states was tying a bag of laundry when his right arm suddenly became stiff, so he called his wife for help, shortly after his right leg became stiff, then he had a 15 minute tonic clonic episode witnessed by his wife. She held onto him and lowered him to the ground. Reports brief post ictal state last a few minutes. (+) tongue biting. Denies urinary/bowel incontinence, head trauma, numbness, weakness, tingling, chest pain, difficulty breathing, fevers, chills, cough, recent illness. Currently endorsing mild b/l parietal headache.  He was here for vEEG in March 2022. Did not need AED. He has not seen Dr. Morejon in the last 6 months. Pt was advised not to drive until seizure free for 1 year in accordance with [facility] law    Consult regarding uncontrolled T2DM w A1c 10/1%. He stated that he was diagnosed with T2DM in 2016 and was put on Metformin 750 mg BID which he stopped several months ago.   He does not have a glucometer at home.  He denies diabetic microvascular disease.  He follows with a PCP but unclear when his last visit was.    PAST MEDICAL & SURGICAL HISTORY:  DM (diabetes mellitus)  RA (rheumatoid arthritis)    FAMILY HISTORY: he does not know    SOCIAL HISTORY: denies tobacco, illicit drugs or EtOh use. from [facility]    MEDICATIONS  (STANDING):  aspirin enteric coated 81 milliGRAM(s) Oral daily  atorvastatin 40 milliGRAM(s) Oral at bedtime  dextrose 5%. 1000 milliLiter(s) (100 mL/Hr) IV Continuous <Continuous>  dextrose 5%. 1000 milliLiter(s) (50 mL/Hr) IV Continuous <Continuous>  dextrose 50% Injectable 25 Gram(s) IV Push once  dextrose 50% Injectable 12.5 Gram(s) IV Push once  dextrose 50% Injectable 25 Gram(s) IV Push once  enoxaparin Injectable 40 milliGRAM(s) SubCutaneous every 24 hours  glucagon  Injectable 1 milliGRAM(s) IntraMuscular once  insulin glargine Injectable (LANTUS) 10 Unit(s) SubCutaneous at bedtime  insulin lispro (ADMELOG) corrective regimen sliding scale   SubCutaneous three times a day before meals  insulin lispro Injectable (ADMELOG) 4 Unit(s) SubCutaneous three times a day before meals  lactated ringers. 1000 milliLiter(s) (80 mL/Hr) IV Continuous <Continuous>    MEDICATIONS  (PRN):  dextrose Oral Gel 15 Gram(s) Oral once PRN Blood Glucose LESS THAN 70 milliGRAM(s)/deciliter    ALLERGIES: No Known Allergies    REVIEW OF SYSTEMS:  CONSTITUTIONAL:  No weakness, fevers or chills  HEAD: No headache  EYES:  No blurry vision  RESPIRATORY:  No cough. No wheezing. No shortness of breath  CARDIOVASCULAR:  No chest pain. No palpitations  GASTROINTESTINAL:  No abdominal or epigastric pain. No nausea or vomiting; No diarrhea or constipation.   GENITOURINARY:  No dysuria, frequency or hematuria  NEUROLOGICAL:  No numbness in extremities  ENDO: No excessive thirst. No nocturia. No dizziness    Vital Signs Last 24 Hrs  T(C): 36.7 (18 Sep 2022 10:03), Max: 36.8 (17 Sep 2022 20:39)  T(F): 98 (18 Sep 2022 10:03), Max: 98.3 (17 Sep 2022 20:39)  HR: 71 (18 Sep 2022 10:03) (63 - 75)  BP: 120/74 (18 Sep 2022 10:03) (120/74 - 142/81)  BP(mean): --  RR: 18 (18 Sep 2022 10:03) (18 - 18)  SpO2: 96% (18 Sep 2022 10:03) (95% - 96%)    Parameters below as of 18 Sep 2022 10:03  Patient On (Oxygen Delivery Method): room air    Physical Exam: currently undergoing EEG  General appearance: Well developed, well nourished.  Eyes: EOMI  Lungs: Normal respiratory excursion. Lungs clear no w/r/r  CV: Normal S1S2, regular. No m/r/g.  Pedal pulses intact.  Abdomen: Soft, nontender, nondistended, (+) BS  Musculoskeletal: No cyanosis, clubbing, or edema.  Skin: Warm and moist. No acanthosis. Feet - no ulcers  Neuro: Cranial nerves intact. Good sensation to light touch.   Psych: Normal affect, good judgement/insight      LABS:                        15.7   9.90  )-----------( 232      ( 16 Sep 2022 23:10 )             45.9     09-18    137  |  100  |  18.3  ----------------------------<  202<H>  3.9   |  25.0  |  0.91    Ca    9.1      18 Sep 2022 04:40  Phos  3.4     09-18  Mg     2.1     09-18    TPro  6.4<L>  /  Alb  4.0  /  TBili  0.3<L>  /  DBili  x   /  AST  16  /  ALT  34  /  AlkPhos  116  09-18    LIVER FUNCTIONS - ( 18 Sep 2022 04:40 )  Alb: 4.0 g/dL / Pro: 6.4 g/dL / ALK PHOS: 116 U/L / ALT: 34 U/L / AST: 16 U/L / GGT: x             A1C with Estimated Average Glucose Result: 10.1 % (09-18-22 @ 04:40)      CAPILLARY BLOOD GLUCOSE  POCT Blood Glucose.: 173 mg/dL (18 Sep 2022 12:06)  POCT Blood Glucose.: 187 mg/dL (18 Sep 2022 08:05)  POCT Blood Glucose.: 246 mg/dL (17 Sep 2022 21:25)  POCT Blood Glucose.: 210 mg/dL (17 Sep 2022 18:46)  POCT Blood Glucose.: 234 mg/dL (17 Sep 2022 17:26)  POCT Blood Glucose.: 196 mg/dL (17 Sep 2022 12:33)

## 2022-09-18 NOTE — CONSULT NOTE ADULT - SUBJECTIVE AND OBJECTIVE BOX
[facility]                                              CARDIOLOGY AT [address], [place] [address]                                             Telephone: [phone]. Fax:[phone]                                                       CARDIOLOGY CONSULTATION NOTE                                                                                             History obtained by: Patient and medical record  Community Cardiologist: none   obtained: Yes [  ] No [ x ] patient is fluent in english and refused translation services  Reason for Consultation: CVA r/o cardioembolic   Available out pt records reviewed: Yes [  ] No [ x ]    Chief complaint:    Patient is a 47y old  Male who presents with a chief complaint of Sz (18 Sep 2022 12:36)      HPI:  47 year old male with PMHx RA, NIDDM, presenting for evaluation s/p seizure episode last night. Patient states was tying a bag of laundry when his right arm suddenly became stiff, so he called his wife for help, shortly after his right leg became stiff, then he had a 15 minute tonic clonic episode witnessed by his wife. She held onto him and lowered him to the ground. Reports brief post ictal state last a few minutes. (+) tongue biting. Denies urinary/bowel incontinence, head trauma, numbness, weakness, tingling, chest pain, difficulty breathing, fevers, chills, cough, recent illness. Currently endorsing mild b/l parietal headache.   He was here for vEEG in March 2022. Did not need AED. He has not seen Dr. Morejon in the last 6 months. Pt was advised not to drive until seizure free for 1 year in accordance with [facility] law    SH- lives with family, Denies any toxic habits  FH- DM2   (17 Sep 2022 09:08)        PAST MEDICAL HISTORY  DM (diabetes mellitus)    RA (rheumatoid arthritis)        PAST SURGICAL HISTORY      SOCIAL HISTORY:  Denies smoking/alcohol/drugs  CIGARETTES:     ALCOHOL:  DRUGS:    FAMILY HISTORY:    Family History of Cardiovascular Disease:  Yes [  ] No [ x ]  Coronary Artery Disease in first degree relative: Yes [  ] No [ x ]  Sudden Cardiac Death in First degree relative: Yes [  ] No [ x ]    HOME MEDICATIONS:  etanercept 50 mg/mL subcutaneous solution: 1 milliliter(s) subcutaneous once a week (18 Mar 2022 16:05)  metFORMIN 750 mg oral tablet, extended release: 1 tab(s) orally once a day (18 Mar 2022 16:04)      CURRENT OTHER MEDICATIONS:  aspirin enteric coated 81 milliGRAM(s) Oral daily  atorvastatin 40 milliGRAM(s) Oral at bedtime  dextrose 5%. 1000 milliLiter(s) (100 mL/Hr) IV Continuous <Continuous>  dextrose 5%. 1000 milliLiter(s) (50 mL/Hr) IV Continuous <Continuous>  dextrose 50% Injectable 25 Gram(s) IV Push once, Stop order after: 1 Doses  dextrose 50% Injectable 12.5 Gram(s) IV Push once, Stop order after: 1 Doses  dextrose 50% Injectable 25 Gram(s) IV Push once, Stop order after: 1 Doses  dextrose Oral Gel 15 Gram(s) Oral once, Stop order after: 1 Doses PRN Blood Glucose LESS THAN 70 milliGRAM(s)/deciliter  enoxaparin Injectable 40 milliGRAM(s) SubCutaneous every 24 hours  glucagon  Injectable 1 milliGRAM(s) IntraMuscular once, Stop order after: 1 Doses  insulin glargine Injectable (LANTUS) 10 Unit(s) SubCutaneous at bedtime  insulin lispro (ADMELOG) corrective regimen sliding scale   SubCutaneous three times a day before meals  insulin lispro Injectable (ADMELOG) 4 Unit(s) SubCutaneous three times a day before meals  lactated ringers. 1000 milliLiter(s) (80 mL/Hr) IV Continuous <Continuous>      ALLERGIES:   No Known Allergies      REVIEW OF SYMPTOMS:   CONSTITUTIONAL: No fever, no chills, no weight loss, no weight gain, no fatigue   ENMT:  No vertigo; No sinus or throat pain  NECK: No pain or stiffness  CARDIOVASCULAR: No chest pain, no dyspnea, no syncope/presyncope, no palpitations, no dizziness, no Orthopnea, no Paroxsymal nocturnal dyspnea  RESPIRATORY: no Shortness of breath, no cough, no wheezing  : No dysuria, no hematuria   GI: No dark color stool, no nausea, no diarrhea, no constipation, no abdominal pain   NEURO: No headache, no slurred speech   MUSCULOSKELETAL: No joint pain or swelling; No muscle, back, or extremity pain  PSYCH: No agitation, no anxiety.    ALL OTHER REVIEW OF SYSTEMS ARE NEGATIVE.    VITAL SIGNS:  T(C): 36.7 (09-18-22 @ 10:03), Max: 36.8 (09-17-22 @ 15:28)  T(F): 98 (09-18-22 @ 10:03), Max: 98.3 (09-17-22 @ 20:39)  HR: 71 (09-18-22 @ 10:03) (63 - 75)  BP: 120/74 (09-18-22 @ 10:03) (120/74 - 142/81)  RR: 18 (09-18-22 @ 10:03) (18 - 18)  SpO2: 96% (09-18-22 @ 10:03) (95% - 96%)    INTAKE AND OUTPUT:     09-17 @ 07:01  -  09-18 @ 07:00  --------------------------------------------------------  IN: 720 mL / OUT: 0 mL / NET: 720 mL        PHYSICAL EXAM:  Constitutional: Comfortable . No acute distress.   HEENT: Atraumatic and normocephalic , neck is supple . no JVD. No carotid bruit.  CNS: A&Ox3. No focal deficits.   Respiratory: CTAB, unlabored   Cardiovascular: RRR normal s1 s2. No murmur. No rubs or gallop.  Gastrointestinal: Soft, non-tender. +Bowel sounds.   Extremities: 2+ Peripheral Pulses, No clubbing, cyanosis, or edema  Psychiatric: Calm . no agitation.   Skin: Warm and dry, no ulcers on extremities     LABS:  ( 17 Sep 2022 08:53 )  Troponin T  X    ,  CPK  297<H>, CKMB  X    , BNP X        , ( 16 Sep 2022 23:10 )  Troponin T  <0.01,  CPK  198  , CKMB  X    , BNP X                                  15.7   9.90  )-----------( 232      ( 16 Sep 2022 23:10 )             45.9     09-18    137  |  100  |  18.3  ----------------------------<  202<H>  3.9   |  25.0  |  0.91    Ca    9.1      18 Sep 2022 04:40  Phos  3.4     09-18  Mg     2.1     09-18    TPro  6.4<L>  /  Alb  4.0  /  TBili  0.3<L>  /  DBili  x   /  AST  16  /  ALT  34  /  AlkPhos  116  09-18    INTERPRETATION OF TELEMETRY: NSR 70s 80s no evidence of any arrythmia or ectopy     ECG:   < from: 12 Lead ECG (09.16.22 @ 23:31) >  Ventricular Rate 89 BPM    Atrial Rate 89 BPM    P-R Interval 142 ms    QRS Duration 84 ms    Q-T Interval 344 ms    QTC Calculation(Bazett) 418 ms    P Axis 42 degrees    R Axis 4 degrees    T Axis 23 degrees    Diagnosis Line Normal sinus rhythm  Normal ECG    < end of copied text >    Prior ECG: Yes [  ] No [ x ]

## 2022-09-18 NOTE — CONSULT NOTE ADULT - NS ATTEND AMEND GEN_ALL_CORE FT
Seizure, generalized tonic clonic: continue with cvEEG   CTH non acute. Prior MRI reviewed showing Chronic Lacunar infarct and calcification focus   Neurology consult appreciated   Prior CVA on MRI: c/w ASA and statin  Cardiology evaluation for NUPUR + ILR   Cardioembolic stroke.   will check NUPUR in AM r/o cardioembolic stroke and possible ILR before discharge   keep NPO @ [facility].

## 2022-09-18 NOTE — CONSULT NOTE ADULT - ASSESSMENT
47 year old male with PMHx HLD, RA, NIDDM, presenting for evaluation s/p seizure episode last night. Patient states was tying a bag of laundry when his right arm suddenly became stiff, so he called his wife for help, shortly after his right leg became stiff, then he had a 15 minute tonic clonic episode witnessed by his wife. He was here for vEEG in March 2022. Did not need AED. He has not seen Dr. Morejon in the last 6 months. Pt was advised not to drive until seizure free for 1 year in accordance with [facility] law.         Seizure, generalized tonic clonic   continue with cvEEG for another day   CTH non acute   Prior MRI reviewed showing Chronic Lacunar infarct and calcification focus   Neurology consult appreciated   no AEDs currently  follow up with Dr Morejon tomorrow     Prior CVA on MRI   c/w ASA and statin   Lipid panel   A1c 10   Cardiology evaluation for NUPUR + ILR

## 2022-09-18 NOTE — PROGRESS NOTE ADULT - ASSESSMENT
47y Male with a second unprovoked seizure.    On v-EEG monitor.   Normal thus far with no clinical events.  Hold off on antiseizure drug for now.  If patient has event on monitor then antiseizure drug will depend upon EEG during event.    If no events then would likely still require antiseizure drug as this is now a second unprovoked episode.   {Given that there is a cortical calcification in the left parietal lobe and seizure begin in right arm and spread to leg before generalizing, further monitoring (presurgical) may be indicated if patient does not respond to medication.}    Stroke on MRI.  As patient is only 47 with only risk factor of Diabetes Mellitus, would suggest cardiology evaluation for eventual NUPUR and loop.   LDL: 112  Goal: < 70  Continue antiplatelet and statin.     Case discussed with Dr Sánchez.

## 2022-09-18 NOTE — PROGRESS NOTE ADULT - ASSESSMENT
47 year old male with PMHx HLD, RA, NIDDM, presenting for evaluation s/p seizure episode last night. Patient states was tying a bag of laundry when his right arm suddenly became stiff, so he called his wife for help, shortly after his right leg became stiff, then he had a 15 minute tonic clonic episode witnessed by his wife. He was here for vEEG in March 2022. Did not need AED. He has not seen Dr. Morejon in the last 6 months. Pt was advised not to drive until seizure free for 1 year in accordance with [facility] law.     Seizure, generalized tonic clonic   continue with cvEEG for another day   CTH non acute   Prior MRI reviewed showing Chronic Lacunar infarct and calcification focus   Neurology consult appreciated   no AEDs currently  follow up with Dr Morejon tomorrow     Prior CVA on MRI   c/w ASA and statin   Lipid panel   A1c 10   Cardiology evaluation for NUPUR + ILR     Uncontrolled DM2  A1c 10   c/w Lantus, Premeal and ISS insulin  Monitor BG   Endo consult     Mild Prerenal azotemia   Post Seizure + hyperglycemia   IVF   Improved     Uncontrolled HLD   c/w statin     RA   maintained on Etanercept     DVT ppx: Lovenox

## 2022-09-19 DIAGNOSIS — I63.9 CEREBRAL INFARCTION, UNSPECIFIED: ICD-10-CM

## 2022-09-19 LAB
ANION GAP SERPL CALC-SCNC: 9 MMOL/L — SIGNIFICANT CHANGE UP (ref 5–17)
BASOPHILS # BLD AUTO: 0.04 K/UL — SIGNIFICANT CHANGE UP (ref 0–0.2)
BASOPHILS NFR BLD AUTO: 0.5 % — SIGNIFICANT CHANGE UP (ref 0–2)
BUN SERPL-MCNC: 17.4 MG/DL — SIGNIFICANT CHANGE UP (ref 8–20)
CALCIUM SERPL-MCNC: 9 MG/DL — SIGNIFICANT CHANGE UP (ref 8.4–10.5)
CHLORIDE SERPL-SCNC: 102 MMOL/L — SIGNIFICANT CHANGE UP (ref 98–107)
CO2 SERPL-SCNC: 27 MMOL/L — SIGNIFICANT CHANGE UP (ref 22–29)
CREAT SERPL-MCNC: 0.77 MG/DL — SIGNIFICANT CHANGE UP (ref 0.5–1.3)
EGFR: 110 ML/MIN/1.73M2 — SIGNIFICANT CHANGE UP
EOSINOPHIL # BLD AUTO: 0.23 K/UL — SIGNIFICANT CHANGE UP (ref 0–0.5)
EOSINOPHIL NFR BLD AUTO: 2.9 % — SIGNIFICANT CHANGE UP (ref 0–6)
GLUCOSE BLDC GLUCOMTR-MCNC: 164 MG/DL — HIGH (ref 70–99)
GLUCOSE BLDC GLUCOMTR-MCNC: 169 MG/DL — HIGH (ref 70–99)
GLUCOSE BLDC GLUCOMTR-MCNC: 178 MG/DL — HIGH (ref 70–99)
GLUCOSE SERPL-MCNC: 177 MG/DL — HIGH (ref 70–99)
HCT VFR BLD CALC: 44.8 % — SIGNIFICANT CHANGE UP (ref 39–50)
HGB BLD-MCNC: 14.9 G/DL — SIGNIFICANT CHANGE UP (ref 13–17)
IMM GRANULOCYTES NFR BLD AUTO: 0.4 % — SIGNIFICANT CHANGE UP (ref 0–0.9)
LYMPHOCYTES # BLD AUTO: 3.22 K/UL — SIGNIFICANT CHANGE UP (ref 1–3.3)
LYMPHOCYTES # BLD AUTO: 41.3 % — SIGNIFICANT CHANGE UP (ref 13–44)
MCHC RBC-ENTMCNC: 29.6 PG — SIGNIFICANT CHANGE UP (ref 27–34)
MCHC RBC-ENTMCNC: 33.3 GM/DL — SIGNIFICANT CHANGE UP (ref 32–36)
MCV RBC AUTO: 89.1 FL — SIGNIFICANT CHANGE UP (ref 80–100)
MONOCYTES # BLD AUTO: 0.61 K/UL — SIGNIFICANT CHANGE UP (ref 0–0.9)
MONOCYTES NFR BLD AUTO: 7.8 % — SIGNIFICANT CHANGE UP (ref 2–14)
NEUTROPHILS # BLD AUTO: 3.67 K/UL — SIGNIFICANT CHANGE UP (ref 1.8–7.4)
NEUTROPHILS NFR BLD AUTO: 47.1 % — SIGNIFICANT CHANGE UP (ref 43–77)
PLATELET # BLD AUTO: 210 K/UL — SIGNIFICANT CHANGE UP (ref 150–400)
POTASSIUM SERPL-MCNC: 3.9 MMOL/L — SIGNIFICANT CHANGE UP (ref 3.5–5.3)
POTASSIUM SERPL-SCNC: 3.9 MMOL/L — SIGNIFICANT CHANGE UP (ref 3.5–5.3)
RBC # BLD: 5.03 M/UL — SIGNIFICANT CHANGE UP (ref 4.2–5.8)
RBC # FLD: 12.9 % — SIGNIFICANT CHANGE UP (ref 10.3–14.5)
SARS-COV-2 RNA SPEC QL NAA+PROBE: SIGNIFICANT CHANGE UP
SODIUM SERPL-SCNC: 138 MMOL/L — SIGNIFICANT CHANGE UP (ref 135–145)
WBC # BLD: 7.8 K/UL — SIGNIFICANT CHANGE UP (ref 3.8–10.5)
WBC # FLD AUTO: 7.8 K/UL — SIGNIFICANT CHANGE UP (ref 3.8–10.5)

## 2022-09-19 PROCEDURE — 95813 EEG EXTND MNTR 61-119 MIN: CPT | Mod: 26

## 2022-09-19 PROCEDURE — 93306 TTE W/DOPPLER COMPLETE: CPT | Mod: 26

## 2022-09-19 PROCEDURE — 99232 SBSQ HOSP IP/OBS MODERATE 35: CPT

## 2022-09-19 PROCEDURE — 99233 SBSQ HOSP IP/OBS HIGH 50: CPT

## 2022-09-19 RX ORDER — INSULIN LISPRO 100/ML
3 VIAL (ML) SUBCUTANEOUS
Refills: 0 | Status: DISCONTINUED | OUTPATIENT
Start: 2022-09-19 | End: 2022-09-19

## 2022-09-19 RX ORDER — METFORMIN HYDROCHLORIDE 850 MG/1
1 TABLET ORAL
Qty: 0 | Refills: 0 | DISCHARGE

## 2022-09-19 RX ORDER — INSULIN LISPRO 100/ML
6 VIAL (ML) SUBCUTANEOUS
Qty: 540 | Refills: 0
Start: 2022-09-19 | End: 2022-10-18

## 2022-09-19 RX ORDER — ISOPROPYL ALCOHOL, BENZOCAINE .7; .06 ML/ML; ML/ML
1 SWAB TOPICAL
Qty: 100 | Refills: 1
Start: 2022-09-19 | End: 2022-11-07

## 2022-09-19 RX ORDER — ASPIRIN/CALCIUM CARB/MAGNESIUM 324 MG
1 TABLET ORAL
Qty: 30 | Refills: 0
Start: 2022-09-19 | End: 2022-10-18

## 2022-09-19 RX ORDER — INSULIN GLARGINE 100 [IU]/ML
15 INJECTION, SOLUTION SUBCUTANEOUS
Qty: 450 | Refills: 0
Start: 2022-09-19 | End: 2022-10-18

## 2022-09-19 RX ORDER — LEVETIRACETAM 250 MG/1
1 TABLET, FILM COATED ORAL
Qty: 60 | Refills: 0
Start: 2022-09-19 | End: 2022-10-18

## 2022-09-19 RX ORDER — METFORMIN HYDROCHLORIDE 850 MG/1
1 TABLET ORAL
Qty: 60 | Refills: 0
Start: 2022-09-19 | End: 2022-10-18

## 2022-09-19 RX ORDER — ATORVASTATIN CALCIUM 80 MG/1
1 TABLET, FILM COATED ORAL
Qty: 30 | Refills: 0
Start: 2022-09-19 | End: 2022-10-18

## 2022-09-19 RX ORDER — INSULIN LISPRO 100/ML
6 VIAL (ML) SUBCUTANEOUS
Refills: 0 | Status: DISCONTINUED | OUTPATIENT
Start: 2022-09-19 | End: 2022-09-20

## 2022-09-19 RX ADMIN — Medication 81 MILLIGRAM(S): at 13:45

## 2022-09-19 RX ADMIN — LEVETIRACETAM 750 MILLIGRAM(S): 250 TABLET, FILM COATED ORAL at 18:43

## 2022-09-19 RX ADMIN — Medication 2: at 08:58

## 2022-09-19 RX ADMIN — INSULIN GLARGINE 15 UNIT(S): 100 INJECTION, SOLUTION SUBCUTANEOUS at 21:08

## 2022-09-19 RX ADMIN — ENOXAPARIN SODIUM 40 MILLIGRAM(S): 100 INJECTION SUBCUTANEOUS at 13:45

## 2022-09-19 RX ADMIN — ATORVASTATIN CALCIUM 40 MILLIGRAM(S): 80 TABLET, FILM COATED ORAL at 21:09

## 2022-09-19 RX ADMIN — SODIUM CHLORIDE 80 MILLILITER(S): 9 INJECTION, SOLUTION INTRAVENOUS at 13:45

## 2022-09-19 RX ADMIN — Medication 2: at 17:11

## 2022-09-19 RX ADMIN — LEVETIRACETAM 400 MILLIGRAM(S): 250 TABLET, FILM COATED ORAL at 05:44

## 2022-09-19 RX ADMIN — Medication 6 UNIT(S): at 17:11

## 2022-09-19 NOTE — PROGRESS NOTE ADULT - SUBJECTIVE AND OBJECTIVE BOX
[facility]                                                                     Jaja Morejon MD                                              Epilepsy Consult #: [id number] ([phone])                                        Office: [address] [place] [address], 2nd floor, [place], [place], [address]                                                 Phone: [phone]; Fax: [phone]                            ==============================================    EPILEPSY FOLLOW-UP NOTE      INTERVAL HISTORY:  First BTC sz with tongue bite witnessed by family on 1/10. Taken to [facility] ER. Discharged on LEV 500mg BID. Ran out of LEV after 15 days since he had no refill. Electively admitted to EMU 3/18 – 3/20. Normal EEG. Discharged without any ASM. Admitted to [facility] evening of 9/16 after a self-limiting convulsive status epileptic x15m that started with stiffening of RUE that progressed to RLE. Despite normal cvEEG, now meeting criteria for epilepsy. Started on levetiracetam this morning.    SEIZURE RISK FACTORS:  Single coarse calcification along the left parietal lobe. Patient was a product of normal pregnancy and delivery. No history of febrile seizure, TBI, CNS infection, or FH of seizures.      MEDICATIONS:   aspirin enteric coated 81 milliGRAM(s) Oral daily  atorvastatin 40 milliGRAM(s) Oral at bedtime  dextrose 5%. 1000 milliLiter(s) (100 mL/Hr) IV Continuous <Continuous>  dextrose 5%. 1000 milliLiter(s) (50 mL/Hr) IV Continuous <Continuous>  dextrose 50% Injectable 25 Gram(s) IV Push once  dextrose 50% Injectable 12.5 Gram(s) IV Push once  dextrose 50% Injectable 25 Gram(s) IV Push once  dextrose Oral Gel 15 Gram(s) Oral once PRN Blood Glucose LESS THAN 70 milliGRAM(s)/deciliter  enoxaparin Injectable 40 milliGRAM(s) SubCutaneous every 24 hours  glucagon  Injectable 1 milliGRAM(s) IntraMuscular once  insulin glargine Injectable (LANTUS) 15 Unit(s) SubCutaneous at bedtime  insulin lispro (ADMELOG) corrective regimen sliding scale   SubCutaneous three times a day before meals  insulin lispro Injectable (ADMELOG) 6 Unit(s) SubCutaneous three times a day before meals  lactated ringers. 1000 milliLiter(s) (80 mL/Hr) IV Continuous <Continuous>  levETIRAcetam 750 milliGRAM(s) Oral two times a day    LAST 24-HR VITALS:  T(C): 36.3 (09-19-22 @ 16:42), Max: 36.7 (09-19-22 @ 04:04)  HR: 82 (09-19-22 @ 16:42) (63 - 82)  BP: 128/75 (09-19-22 @ 16:42) (110/70 - 130/82)  ABP: --  RR: 18 (09-19-22 @ 16:42) (18 - 18)  SpO2: 96% (09-19-22 @ 16:42) (94% - 96%)  CVP(cm H2O): --    GENERAL PHYSICAL EXAM:  GEN: no distress   HEENT: NCAT, OP clear  EYES: sclera white, conjunctiva clear, no nystagmus  NECK: supple  CV: RRR, no murmur     		  PULM: CTAB, no wheezing  ABD: soft, +BS, NT  EXT: peripheral pulse intact, no cyanosis  MSK: muscle tone and strength normal  SKIN: warm, dry    NEUROLOGICAL EXAM:  Mental Status  Orientation: awake and alert  Language: clear     Cranial Nerves  II: full visual fields intact   III, IV, VI: PERRL, EOMI  V, VII: facial sensation and movement intact and symmetric   VIII: hearing intact   IX, X: uvula midline, soft palate elevates normally   XI: BL shoulder shrug intact   XII: tongue midline    Motor  5/5 BUE and BLE                 Tone and bulk are normal in upper and lower limbs  No pronator drift    Sensation  Intact to light touch and pinprick in all 4 EXTs    Reflex  2+ in BL biceps and patella                                    Plantar responses downward bilaterally    Coordination  Normal FTN bilaterally    Gait  Deferred       LABS:                          14.9   7.80  )-----------( 210      ( 19 Sep 2022 06:48 )             44.8     09-19    138  |  102  |  17.4  ----------------------------<  177<H>  3.9   |  27.0  |  0.77    Ca    9.0      19 Sep 2022 06:48  Phos  3.4     09-18  Mg     2.1     09-18    TPro  6.4<L>  /  Alb  4.0  /  TBili  0.3<L>  /  DBili  x   /  AST  16  /  ALT  34  /  AlkPhos  116  09-18    CAPILLARY BLOOD GLUCOSE      POCT Blood Glucose.: 178 mg/dL (19 Sep 2022 21:07)  POCT Blood Glucose.: 169 mg/dL (19 Sep 2022 17:07)  POCT Blood Glucose.: 164 mg/dL (19 Sep 2022 08:00)    LIVER FUNCTIONS - ( 18 Sep 2022 04:40 )  Alb: 4.0 g/dL / Pro: 6.4 g/dL / ALK PHOS: 116 U/L / ALT: 34 U/L / AST: 16 U/L / GGT: x                 OTHER IMAGING AND STUDIES:    non-elective EMU 9/17 - 9/19/22:  EEG Classification / Summary:  Normal EEG in the awake / drowsy / asleep state(s).    Clinical Impression:  Normal VEEG  There were no epileptiform abnormalities recorded.

## 2022-09-19 NOTE — EEG REPORT - NS EEG TEXT BOX
[facility]  Epilepsy Monitoring Unit Report    [facility]: [address] , [place] [address], Phone [phone]  [place] Office: [address], [address], [place] [address] Phone [phone]    [facility]: [address] [place] [address], Phone [phone]  [place] Office: [address] [place] [address], Phone [phone]    Patient Name: Louise Donohue    Age: 48 year, : 1974  Patient ID: -, MRN #: -, Grey: -    Physician Ordering Inpatient EEG: -  Referral Source to EMU: elective admission – Dr. Morejon     EMU Study Started: 11:34 on 22    EMU Study Ended: pending discharge    Study Information:    EEG Recording Technique:  The patient underwent continuous Video-EEG monitoring, using Telemetry System hardware on the XLTek Digital System. EEG and video data were stored on a computer hard drive with important events saved in digital archive files. The material was reviewed by a physician (electroencephalographer / epileptologist) on a daily basis. Santino and seizure detection algorithms were utilized and reviewed. An EEG Technician attended to the patient, and was available throughout daytime work hours.  The epilepsy center neurologist was available in person or on call 24-hours per day.    EEG Placement and Labeling of Electrodes:  The EEG was performed utilizing 20 channel referential EEG connections (coronal over temporal over parasagittal montage) using all standard 10-20 electrode placements with EKG, with additional electrodes placed in the inferior temporal region using the modified 10-10 montage electrode placements for elective admissions, or if deemed necessary. Recording was at a sampling rate of 256 samples per second per channel. Time synchronized digital video recording was done simultaneously with EEG recording. A low light infrared camera was used for low light recording.               History:  This is a 48 year-old  female with presumed epilepsy who presents to EMU to differentiate epileptic from nonepileptic events.    Sz onset at age 32, when she was under a lot of stress. A total of 5-6 lifetime nocturnal convulsion that have been controlled on carbamazepine ER 400mg QHS. Earlier this year, self-reduced carbamazepine ER to 200mg every other day due to concern for side effects. At one point, said carbamazepine caused vaginal bleeding, today reporting headache and fatigue. Carbamazepine level undetectable, but patient has not had any breakthrough seizures. Took ASM last night.    SEIZURE/SPELL DESCRIPTION AND TYPE:  Type #1  Severity: bilateral tonic clonic seizures VS psychogenic non-epileptic seizure (PNES)   Onset: 31yo  Quality & associated signs/symptoms: neck tightness/pain (aura, day before) drools, jaw clenches, fingers clench --> full body convulsions --> post ictal confusion (about 5 minutes) and muscle soreness the following day  Duration: unsure, about a minute   Timin-6 lifetime, last one in   Modifying factors: triggered by absence of ASM x3 days   Diurnal Variation: nocturnal    SEIZURE RISK FACTORS:  Patient was a product of normal pregnancy and delivery. No history of febrile seizure, TBI, CNS infection, or FH of seizures.    CURRENT ASM:  CBZ ER 200mg every other night – undetectable level on 8/10/22    PREVIOUS ASM:  none    IMAGING:   MRI head () - normal with signs of sinusitis   MRI head w/wo () - unremarkable     NEUROPHYSIOLOGY:  EEG (2016) - normal  24hr aEEG (22) - normal    Home Antiseizure Medication and Device  CBZ ER 200mg every other night    Interpretation:    Start Date: 2022 – Day 1                                Start Time – 11:34       Duration – 19h 36m    Daily EEG Visual Analysis  Findings: The background was continuous and reactive. During wakefulness, the posterior dominant rhythm consisted of symmetric, well-modulated 10 Hz activity, with amplitude to 30 uV, that attenuated to eye opening.     Background Slowing:  No generalized background slowing was present.    Focal Slowing:   Intermittent, independent theta/delta slowing in the bitemporal regions.    Sleep Background:  Drowsiness was characterized by fragmentation, attenuation, and slowing of the background activity.    Sleep was characterized by the presence of vertex waves, symmetric sleep spindles and K-complexes.    Other Non-Epileptiform Findings:  None were present.    Interictal Epileptiform Activity:   No definitive epileptiform discharges.    Events:  No events or seizures recorded.    Artifacts:  Intermittent myogenic and movement artifacts were noted.    ECG:  The heart rate on single channel ECG was predominantly between 70-80 BPM.    ASM:  None    Start Date: 9/15/2022 – Day 2                                       Start Time – 08:00      Duration – 24h    Daily EEG Visual Analysis  FINDINGS:  The background, artifacts and HR on single channel ECG were similar as previous day.    Interictal Epileptiform Activity:   No definitive epileptiform discharges.    Events:  No events or seizures recorded.    ASM:  None     Start Date: 2022 – Day 3                                       Start Time – 08:00      Duration – 24h    Daily EEG Visual Analysis  FINDINGS:  The background, artifacts and HR on single channel ECG were similar as previous day.    Interictal Epileptiform Activity:   No epileptiform discharges.    Events:  No events or seizures recorded.    ASM:  None     Start Date: 2022 – Day 4                                       Start Time – 08:00      Duration – 24h    Daily EEG Visual Analysis  FINDINGS:  The background, artifacts and HR on single channel ECG were similar as previous day.    Interictal Epileptiform Activity:   No epileptiform discharges.    Events:  No events or seizures recorded.    ASM:  None     Start Date: 2022 – Day 5                                       Start Time – 08:00      Duration – 24h    Daily EEG Visual Analysis  FINDINGS:  The background, artifacts and HR on single channel ECG were similar as previous day.    Interictal Epileptiform Activity:   No definitive epileptiform discharges.    Events:  No events or seizures recorded.    ASM:  None     EEG Summary:  Abnormal EEG in the awake, drowsy and asleep states.  - Intermittent, independent theta/delta slowing in the bitemporal regions.    Impression/Clinical Correlate:   – 9/15: no event or seizure  9/15 – : no event or seizure   – : no event or seizure   – : no event or seizure   – : no event or seizure    No events or seizures were recorded. Interictal findings suggest functional abnormality in the bitemporal regions.    ________________________________________    Jaja Morejon MD ( – ,  – )   Director, Epilepsy/EMU - [facility]     Rula Toscano MD ( – )  Attending Physician    Ezekiel Ramirez MD ( – )  Epilepsy Fellow

## 2022-09-19 NOTE — PROGRESS NOTE ADULT - SUBJECTIVE AND OBJECTIVE BOX
[facility]                                                         CARDIOLOGY AT [facility]                                                                  [address] [id number] [place] [address]                                                         Telephone: [phone]. Fax:[phone]                                                                             PROGRESS NOTE    Reason for follow up: on remote hx of cva  Update: Having NUPUR today then loop      Review of symptoms:   Cardiac:  No chest pain. No dyspnea. No palpitations.  Respiratory: no cough. No dyspnea  Gastrointestinal: No diarrhea. No abdominal pain. No bleeding.   Neuro: No focal neuro complaints.      Vitals:  T(C): 36.7 (09-19-22 @ 10:31), Max: 36.7 (09-19-22 @ 04:04)  HR: 65 (09-19-22 @ 10:31) (63 - 82)  BP: 130/82 (09-19-22 @ 10:31) (110/70 - 130/82)  RR: 18 (09-19-22 @ 10:31) (18 - 18)  SpO2: 94% (09-19-22 @ 10:31) (94% - 96%)  Wt(kg): --  I&O's Summary    Weight (kg): 83.9 (09-16 @ 21:57)      PHYSICAL EXAM:  Appearance: Comfortable. No acute distress  HEENT:  Atraumatic. Normocephalic.  Normal oral mucosa  Neurologic: A & O x 3, no gross focal deficits.  Cardiovascular: RRR S1 S2, No murmur, no rubs/gallops. No JVD  Respiratory: Lungs clear to auscultation, unlabored   Gastrointestinal:  Soft, Non-tender, + BS  Lower Extremities: No edema  Psychiatry: Patient is calm. No agitation.   Skin: warm and dry.      CURRENT MEDICATIONS:  MEDICATIONS  (STANDING):  aspirin enteric coated 81 milliGRAM(s) Oral daily  atorvastatin 40 milliGRAM(s) Oral at bedtime  dextrose 5%. 1000 milliLiter(s) (100 mL/Hr) IV Continuous <Continuous>  dextrose 5%. 1000 milliLiter(s) (50 mL/Hr) IV Continuous <Continuous>  dextrose 50% Injectable 25 Gram(s) IV Push once  dextrose 50% Injectable 12.5 Gram(s) IV Push once  dextrose 50% Injectable 25 Gram(s) IV Push once  enoxaparin Injectable 40 milliGRAM(s) SubCutaneous every 24 hours  glucagon  Injectable 1 milliGRAM(s) IntraMuscular once  insulin glargine Injectable (LANTUS) 15 Unit(s) SubCutaneous at bedtime  insulin lispro (ADMELOG) corrective regimen sliding scale   SubCutaneous three times a day before meals  insulin lispro Injectable (ADMELOG) 6 Unit(s) SubCutaneous three times a day before meals  lactated ringers. 1000 milliLiter(s) (80 mL/Hr) IV Continuous <Continuous>  levETIRAcetam 750 milliGRAM(s) Oral two times a day          LABS:	 	  CARDIAC MARKERS ( 17 Sep 2022 08:53 )  x     / x     / 297 U/L / x     / 4.8 ng/mL  p-BNP 17 Sep 2022 08:53: x    , CARDIAC MARKERS ( 16 Sep 2022 23:10 )  x     / <0.01 ng/mL / 198 U/L / x     / 4.2 ng/mL  p-BNP 16 Sep 2022 23:10: x                              14.9   7.80  )-----------( 210      ( 19 Sep 2022 06:48 )             44.8     09-19    138  |  102  |  17.4  ----------------------------<  177<H>  3.9   |  27.0  |  0.77    Ca    9.0      19 Sep 2022 06:48  Phos  3.4     09-18  Mg     2.1     09-18    TPro  6.4<L>  /  Alb  4.0  /  TBili  0.3<L>  /  DBili  x   /  AST  16  /  ALT  34  /  AlkPhos  116  09-18    TELEMETRY:Monitor d/dorene this am    was in NSR  ECG:  	      DIAGNOSTIC TESTING:  NUPUR is pending

## 2022-09-19 NOTE — ADVANCED PRACTICE NURSE CONSULT - ASSESSMENT
went to see pt in am pt is a+ox3, co 0 pain. pt states he has diabetes for s few years, he was taking metformin until a year ago and stopped. he has a glucose meter at home but does not check bg. pt was educated about the importace of adding insulin for his regimen. pt verbalized understanding. pt was educated about the importance of taking meds as prescribed and not stopping wo consulting w his provider. pt was educated about ss of hypoglycemia and treatment, pt verbalized understanding. discussed healthy eating habits and assisted him to make healthy choices for meals and snacks to fit into lifestyle and ethnicity. written material about healthy eating and the plate method provided in Mongolian. written material about diabetes self management and insulin pen provided in  Mongolian

## 2022-09-19 NOTE — PROGRESS NOTE ADULT - ASSESSMENT
This is a 47 year-old RH male with a history of DM II, RA, chronic lacunar infarct in left basal ganglia and single coarse calcification along the left parietal lobe who was admitted for second lifetime BTC seizure that presented as CSE. Personally reviewed all imagines, labs, EEG and other studies.    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 Likely structural in etiology (left parietal calcification). Normal interictal in cvEEG.  2. DM II  3. RA     Recommendation:  - discontinue cvEEG  - s/p levetiracetam 1500mg -> 750mg BID  - endo managing DM  - seizure precaution      No acute intervention from Epilepsy at this time.  Please reconsult if needed.   ______________________  Jaja Morejon MD   Director, Epilepsy/EMU - [facility]   Epilepsy Consult #: [id number] ([phone])

## 2022-09-19 NOTE — PROGRESS NOTE ADULT - ASSESSMENT
47 year old male with PMHx HLD, RA, NIDDM, presenting for evaluation s/p seizure episode. Pt has MRI earlier this year that showed chronic lacunar infarct left lentiform nucleus and very mild small vessel ischemic changes.    1. Uncontrolled T2DM, a1c 10.1%  - Start Metformin 500mg BID  - Decrease Admelog to 3 units TID with meals   - Continue Lantus 15 units qhs  - RD/CDE consult placed for insulin and diabetic education  - F/u appointment: 10/21 at 11am at [place] office      2. HLD   - Statin    3. Seizure  - vEEG  - Neurology following   47 year old male with PMHx HLD, RA, NIDDM, presenting for evaluation s/p seizure episode. Pt has MRI earlier this year that showed chronic lacunar infarct left lentiform nucleus and very mild small vessel ischemic changes.    1. Uncontrolled T2DM, a1c 10.1%  - Continue Admelog 6 units TID with meals  - Continue Lantus 15 units qhs  - Will start metformin 500mg BID after testing with contrast is complete (TTE with contrast is pending)  - RD/CDE consult placed for insulin and diabetic education  - F/u appointment: 10/21 at 11am at [place] office      2. HLD   - Statin    3. Seizure  - vEEG  - Neurology following

## 2022-09-19 NOTE — PROGRESS NOTE ADULT - ASSESSMENT
47 year old male with PMHx HLD, RA, NIDDM, presenting for evaluation s/p seizure episode last night. Patient states was tying a bag of laundry when his right arm suddenly became stiff, so he called his wife for help, shortly after his right leg became stiff, then he had a 15 minute tonic clonic episode witnessed by his wife. He was here for vEEG in March 2022. Did not need AED. He has not seen Dr. Morejon in the last 6 months. Pt was advised not to drive until seizure free for 1 year in accordance with [facility] law.

## 2022-09-19 NOTE — ADVANCED PRACTICE NURSE CONSULT - RECOMMEDATIONS
continue diabetes self management education  pt to inject all insulin doses while in the hospital using prefilled insulin syringe and rn supervision  insulin pen teaching

## 2022-09-19 NOTE — PROGRESS NOTE ADULT - ASSESSMENT
47 year old male with PMHx HLD, RA, NIDDM, presenting for evaluation s/p seizure episode last night. Patient states was tying a bag of laundry when his right arm suddenly became stiff, so he called his wife for help, shortly after his right leg became stiff, then he had a 15 minute tonic clonic episode witnessed by his wife. He was here for vEEG in March 2022. Did not need AED. He has not seen Dr. Morejon in the last 6 months. Pt was advised not to drive until seizure free for 1 year in accordance with [facility] law.     Seizure, generalized tonic clonic   EEG without any events   CTH non acute   Prior MRI reviewed showing Chronic Lacunar infarct and calcification focus   Neurology consult appreciated   started on Keppra   follow up with Dr Morejon     Prior CVA on MRI   c/w ASA and statin   Lipid panel   A1c 10   Cardiology evaluation for NUPUR + ILR appreciated  Plan for NUPUR tomorrow     Uncontrolled DM2  A1c 10   c/w Lantus, Premeal and ISS insulin  Monitor BG   Endo consult appreciated     Mild Prerenal azotemia   resolved     Uncontrolled HLD   c/w statin     RA   maintained on Etanercept     DVT ppx: Lovenox

## 2022-09-19 NOTE — CHART NOTE - NSCHARTNOTEFT_GEN_A_CORE
Patient seen an interviewed today and chart reviewed. Patient concluded video EEG at 11AM and went downstairs for TTE. NUPUR was postponed due to lack of staffing and anesthesia support.   I explained ILR insertion for the surveillance of AFib to the patient at length. He prefers to think about it but would not want to have the ILR insertion this admission. Willing to come to our office.   Very upset that he was NPO all day and that NUPUR is postponed till tomorrow. Does not want to stay in the hospital any longer. Would like to eat now  Patient reports he wants to leave for home today even if that means signing out AMA.

## 2022-09-19 NOTE — PROGRESS NOTE ADULT - SUBJECTIVE AND OBJECTIVE BOX
[facility] Division of Hospital Medicine    Chief Complaint: Seizure      SUBJECTIVE / OVERNIGHT EVENTS:  Pt reports he feels well  Pt upset that NUPUR was cancelled today     Patient denies chest pain, SOB, abd pain, N/V, fever, chills, dysuria or any other complaints. All remainder ROS negative.     MEDICATIONS  (STANDING):  aspirin enteric coated 81 milliGRAM(s) Oral daily  atorvastatin 40 milliGRAM(s) Oral at bedtime  dextrose 5%. 1000 milliLiter(s) (100 mL/Hr) IV Continuous <Continuous>  dextrose 5%. 1000 milliLiter(s) (50 mL/Hr) IV Continuous <Continuous>  dextrose 50% Injectable 25 Gram(s) IV Push once  dextrose 50% Injectable 12.5 Gram(s) IV Push once  dextrose 50% Injectable 25 Gram(s) IV Push once  enoxaparin Injectable 40 milliGRAM(s) SubCutaneous every 24 hours  glucagon  Injectable 1 milliGRAM(s) IntraMuscular once  insulin glargine Injectable (LANTUS) 15 Unit(s) SubCutaneous at bedtime  insulin lispro (ADMELOG) corrective regimen sliding scale   SubCutaneous three times a day before meals  insulin lispro Injectable (ADMELOG) 6 Unit(s) SubCutaneous three times a day before meals  lactated ringers. 1000 milliLiter(s) (80 mL/Hr) IV Continuous <Continuous>  levETIRAcetam 750 milliGRAM(s) Oral two times a day    MEDICATIONS  (PRN):  dextrose Oral Gel 15 Gram(s) Oral once PRN Blood Glucose LESS THAN 70 milliGRAM(s)/deciliter        I&O's Summary      PHYSICAL EXAM:  Vital Signs Last 24 Hrs  T(C): 36.7 (19 Sep 2022 10:31), Max: 36.7 (19 Sep 2022 04:04)  T(F): 98.1 (19 Sep 2022 10:31), Max: 98.1 (19 Sep 2022 10:31)  HR: 65 (19 Sep 2022 10:31) (63 - 82)  BP: 130/82 (19 Sep 2022 10:31) (110/70 - 130/82)  BP(mean): --  RR: 18 (19 Sep 2022 10:31) (18 - 18)  SpO2: 94% (19 Sep 2022 10:31) (94% - 96%)    Parameters below as of 19 Sep 2022 10:31  Patient On (Oxygen Delivery Method): room air        CONSTITUTIONAL: NAD, sitting up in bed  ENMT: Moist oral mucosa, EOMI, bite marks on tongue   RESPIRATORY: Normal respiratory effort; lungs are clear to auscultation bilaterally  CARDIOVASCULAR: Regular rate and rhythm, normal S1 and S2, No lower extremity edema  ABDOMEN: Nontender to palpation, normoactive bowel sounds  MUSCULOSKELETAL:  No clubbing or cyanosis of digits   PSYCH: A+O to person, place, and time; affect appropriate  NEUROLOGY: No gross sensory or motor deficits   SKIN: Warm and dry       LABS:                        14.9   7.80  )-----------( 210      ( 19 Sep 2022 06:48 )             44.8     09-19    138  |  102  |  17.4  ----------------------------<  177<H>  3.9   |  27.0  |  0.77    Ca    9.0      19 Sep 2022 06:48  Phos  3.4     09-18  Mg     2.1     09-18    TPro  6.4<L>  /  Alb  4.0  /  TBili  0.3<L>  /  DBili  x   /  AST  16  /  ALT  34  /  AlkPhos  116  09-18            CAPILLARY BLOOD GLUCOSE      POCT Blood Glucose.: 164 mg/dL (19 Sep 2022 08:00)  POCT Blood Glucose.: 161 mg/dL (18 Sep 2022 21:07)  POCT Blood Glucose.: 156 mg/dL (18 Sep 2022 16:10)

## 2022-09-19 NOTE — PROGRESS NOTE ADULT - SUBJECTIVE AND OBJECTIVE BOX
CC: Follow up diabetes management     INTERVAL HPI/OVERNIGHT EVENTS:  No acute events    ROS: Patient denies chest pain, SOB, abd pain, N/V, or any other complaints. All remainder ROS negative.     MEDICATIONS  (STANDING):  aspirin enteric coated 81 milliGRAM(s) Oral daily  atorvastatin 40 milliGRAM(s) Oral at bedtime  dextrose 5%. 1000 milliLiter(s) (100 mL/Hr) IV Continuous <Continuous>  dextrose 5%. 1000 milliLiter(s) (50 mL/Hr) IV Continuous <Continuous>  dextrose 50% Injectable 25 Gram(s) IV Push once  dextrose 50% Injectable 12.5 Gram(s) IV Push once  dextrose 50% Injectable 25 Gram(s) IV Push once  enoxaparin Injectable 40 milliGRAM(s) SubCutaneous every 24 hours  glucagon  Injectable 1 milliGRAM(s) IntraMuscular once  insulin glargine Injectable (LANTUS) 15 Unit(s) SubCutaneous at bedtime  insulin lispro (ADMELOG) corrective regimen sliding scale   SubCutaneous three times a day before meals  insulin lispro Injectable (ADMELOG) 6 Unit(s) SubCutaneous three times a day before meals  lactated ringers. 1000 milliLiter(s) (80 mL/Hr) IV Continuous <Continuous>  levETIRAcetam 750 milliGRAM(s) Oral two times a day    MEDICATIONS  (PRN):  dextrose Oral Gel 15 Gram(s) Oral once PRN Blood Glucose LESS THAN 70 milliGRAM(s)/deciliter    Allergies  No Known Allergies    Vital Signs Last 24 Hrs  T(C): 36.7 (19 Sep 2022 10:31), Max: 36.7 (19 Sep 2022 04:04)  T(F): 98.1 (19 Sep 2022 10:31), Max: 98.1 (19 Sep 2022 10:31)  HR: 65 (19 Sep 2022 10:31) (63 - 82)  BP: 130/82 (19 Sep 2022 10:31) (110/70 - 130/82)  BP(mean): --  RR: 18 (19 Sep 2022 10:31) (18 - 18)  SpO2: 94% (19 Sep 2022 10:31) (94% - 96%)    Parameters below as of 19 Sep 2022 10:31  Patient On (Oxygen Delivery Method): room air      PHYSICAL EXAM:  General: No apparent distress  Neck: Supple, trachea midline, no thyromegaly  Respiratory: Lungs clear bilaterally, normal rate, effort  Cardiac: +S1, S2, no m/r/g  GI: +BS, soft, non tender, non distended  Extremities: No peripheral edema, no pedal lesions  Neuro: A+O X3, no tremor  Pysch: Affect appropriate   Skin: No acanthosis       LABS:                        14.9   7.80  )-----------( 210      ( 19 Sep 2022 06:48 )             44.8     09-19    138  |  102  |  17.4  ----------------------------<  177<H>  3.9   |  27.0  |  0.77    Ca    9.0      19 Sep 2022 06:48  Phos  3.4     09-18  Mg     2.1     09-18    TPro  6.4<L>  /  Alb  4.0  /  TBili  0.3<L>  /  DBili  x   /  AST  16  /  ALT  34  /  AlkPhos  116  09-18      POCT Blood Glucose.: 164 mg/dL (09-19-22 @ 08:00)  POCT Blood Glucose.: 161 mg/dL (09-18-22 @ 21:07)  POCT Blood Glucose.: 156 mg/dL (09-18-22 @ 16:10)

## 2022-09-20 ENCOUNTER — TRANSCRIPTION ENCOUNTER (OUTPATIENT)
Age: 47
End: 2022-09-20

## 2022-09-20 VITALS
HEART RATE: 67 BPM | TEMPERATURE: 98 F | DIASTOLIC BLOOD PRESSURE: 84 MMHG | SYSTOLIC BLOOD PRESSURE: 130 MMHG | OXYGEN SATURATION: 97 % | RESPIRATION RATE: 20 BRPM

## 2022-09-20 LAB
GLUCOSE BLDC GLUCOMTR-MCNC: 109 MG/DL — HIGH (ref 70–99)
GLUCOSE BLDC GLUCOMTR-MCNC: 152 MG/DL — HIGH (ref 70–99)
GLUCOSE BLDC GLUCOMTR-MCNC: 153 MG/DL — HIGH (ref 70–99)

## 2022-09-20 PROCEDURE — 95714 VEEG EA 12-26 HR UNMNTR: CPT

## 2022-09-20 PROCEDURE — 84484 ASSAY OF TROPONIN QUANT: CPT

## 2022-09-20 PROCEDURE — 85025 COMPLETE CBC W/AUTO DIFF WBC: CPT

## 2022-09-20 PROCEDURE — U0005: CPT

## 2022-09-20 PROCEDURE — 83036 HEMOGLOBIN GLYCOSYLATED A1C: CPT

## 2022-09-20 PROCEDURE — 99232 SBSQ HOSP IP/OBS MODERATE 35: CPT

## 2022-09-20 PROCEDURE — 80307 DRUG TEST PRSMV CHEM ANLYZR: CPT

## 2022-09-20 PROCEDURE — 82962 GLUCOSE BLOOD TEST: CPT

## 2022-09-20 PROCEDURE — 93320 DOPPLER ECHO COMPLETE: CPT

## 2022-09-20 PROCEDURE — 95700 EEG CONT REC W/VID EEG TECH: CPT

## 2022-09-20 PROCEDURE — 82550 ASSAY OF CK (CPK): CPT

## 2022-09-20 PROCEDURE — 80053 COMPREHEN METABOLIC PANEL: CPT

## 2022-09-20 PROCEDURE — 93312 ECHO TRANSESOPHAGEAL: CPT

## 2022-09-20 PROCEDURE — 80048 BASIC METABOLIC PNL TOTAL CA: CPT

## 2022-09-20 PROCEDURE — C8929: CPT

## 2022-09-20 PROCEDURE — 99239 HOSP IP/OBS DSCHRG MGMT >30: CPT

## 2022-09-20 PROCEDURE — 99285 EMERGENCY DEPT VISIT HI MDM: CPT

## 2022-09-20 PROCEDURE — U0003: CPT

## 2022-09-20 PROCEDURE — 83735 ASSAY OF MAGNESIUM: CPT

## 2022-09-20 PROCEDURE — 84100 ASSAY OF PHOSPHORUS: CPT

## 2022-09-20 PROCEDURE — 70450 CT HEAD/BRAIN W/O DYE: CPT | Mod: MA

## 2022-09-20 PROCEDURE — 82553 CREATINE MB FRACTION: CPT

## 2022-09-20 PROCEDURE — 36415 COLL VENOUS BLD VENIPUNCTURE: CPT

## 2022-09-20 PROCEDURE — 93005 ELECTROCARDIOGRAM TRACING: CPT

## 2022-09-20 PROCEDURE — 95813 EEG EXTND MNTR 61-119 MIN: CPT

## 2022-09-20 PROCEDURE — 93325 DOPPLER ECHO COLOR FLOW MAPG: CPT

## 2022-09-20 RX ORDER — METFORMIN HYDROCHLORIDE 850 MG/1
500 TABLET ORAL
Refills: 0 | Status: DISCONTINUED | OUTPATIENT
Start: 2022-09-20 | End: 2022-09-20

## 2022-09-20 RX ORDER — INSULIN LISPRO 100/ML
3 VIAL (ML) SUBCUTANEOUS
Refills: 0 | Status: DISCONTINUED | OUTPATIENT
Start: 2022-09-20 | End: 2022-09-20

## 2022-09-20 RX ADMIN — Medication 6 UNIT(S): at 13:21

## 2022-09-20 RX ADMIN — LEVETIRACETAM 750 MILLIGRAM(S): 250 TABLET, FILM COATED ORAL at 05:04

## 2022-09-20 RX ADMIN — Medication 81 MILLIGRAM(S): at 17:51

## 2022-09-20 RX ADMIN — SODIUM CHLORIDE 80 MILLILITER(S): 9 INJECTION, SOLUTION INTRAVENOUS at 13:25

## 2022-09-20 RX ADMIN — Medication 2: at 17:49

## 2022-09-20 RX ADMIN — LEVETIRACETAM 750 MILLIGRAM(S): 250 TABLET, FILM COATED ORAL at 17:51

## 2022-09-20 RX ADMIN — Medication 3 UNIT(S): at 17:48

## 2022-09-20 RX ADMIN — Medication 2: at 08:22

## 2022-09-20 RX ADMIN — METFORMIN HYDROCHLORIDE 500 MILLIGRAM(S): 850 TABLET ORAL at 18:19

## 2022-09-20 NOTE — PROGRESS NOTE ADULT - ASSESSMENT
47 year old male with PMHx HLD, RA, NIDDM, presenting for evaluation s/p seizure episode. Pt has MRI earlier this year that showed chronic lacunar infarct left lentiform nucleus and very mild small vessel ischemic changes.    1. Uncontrolled T2DM, a1c 10.1%  - Metformin 500mg BID started  - Admelog decreased to 3 units TID with meals  - Continue Lantus 15 units qhs  - Can discharge on metformin and basal insulin  - F/u appointment: 10/21 at 11am at [place] office      2. HLD   - Statin    3. Seizure  - vEEG  - Neurology following

## 2022-09-20 NOTE — DISCHARGE NOTE PROVIDER - CARE PROVIDER_API CALL
Jaja Morejon)  EEGEpilepsy; Neurology  [address] [facility], [address]nd [place] [address]  Phone: [phone]  Fax: [phone]  Follow Up Time:     Loki Reza)  Cardiovascular Disease; Internal Medicine  [address] [facility], [address]  [place] [address]  Phone: [phone]  Fax: [phone]  Follow Up Time:    Jaja Morejon)  EEGEpilepsy; Neurology  [address] [facility], [address]nd [place] [address]  Phone: [phone]  Fax: [phone]  Follow Up Time:     Loki Reza)  Cardiovascular Disease; Internal Medicine  [address] [facility], [address]  [place] [address]  Phone: [phone]  Fax: [phone]  Follow Up Time:     Blayne Pierce)  EndocrinologyMetabDiabetes; Internal Medicine  [address] [place] [address]  Phone: [phone]  Fax: [phone]  Follow Up Time:

## 2022-09-20 NOTE — DISCHARGE NOTE PROVIDER - NSDCMRMEDTOKEN_GEN_ALL_CORE_FT
Admelog 100 units/mL injectable solution: 6 unit(s) injectable 3 times a day (with meals) MDD:30 day supply  alcohol swabs : Apply topically to affected area 4 times a day   aspirin 81 mg oral delayed release tablet: 1 tab(s) orally once a day  atorvastatin 40 mg oral tablet: 1 tab(s) orally once a day (at bedtime)  etanercept 50 mg/mL subcutaneous solution: 1 milliliter(s) subcutaneous once a week  glucometer (per patient&#x27;s insurance): Test blood sugars four times a day. Dispense #1 glucometer.  insulin glargine 100 units/mL subcutaneous solution: 15 unit(s) subcutaneous once a day (at bedtime) MDD:30 day supply  Insulin Pen Needles, 4mm: 1 application subcutaneously 4 times a day. ** Use with insulin pen **   lancets: 1 application subcutaneously 4 times a day   levETIRAcetam 750 mg oral tablet: 1 tab(s) orally 2 times a day  metFORMIN 500 mg oral tablet: 1 tab(s) orally 2 times a day   test strips (per patient&#x27;s insurance): 1 application subcutaneously 4 times a day. ** Compatible with patient&#x27;s glucometer **

## 2022-09-20 NOTE — DISCHARGE NOTE PROVIDER - HOSPITAL COURSE
47 year old male with PMHx HLD, RA, NIDDM, presenting for evaluation s/p seizure episode last night. Patient states was tying a bag of laundry when his right arm suddenly became stiff, so he called his wife for help, shortly after his right leg became stiff, then he had a 15 minute tonic clonic episode witnessed by his wife. He was here for vEEG in March 2022. Did not need AED. He has not seen Dr. Morejon in the last 6 months. Pt was advised not to drive until seizure free for 1 year in accordance with [facility] law.   Seizure, generalized tonic clonic - EEG without any events. CTH non acute. Prior MRI reviewed showing Chronic Lacunar infarct and calcification focus. Neurology consult appreciated, started on Keppra and will need follow up with Dr Morejon   Prior CVA on MRI - started on ASA and statin. Cardiology consulted for NUPUR + ILR. Pt underwent NUPUR but refused ILR.   Uncontrolled DM2 - A1c 10. Endo consulted and recommended starting Insulin. DM and Insulin teaching was done.   HLD - c/w statin   RA - maintained on Etanercept      47 year old male with PMHx HLD, RA, NIDDM, presenting for evaluation s/p seizure episode last night. Patient states was tying a bag of laundry when his right arm suddenly became stiff, so he called his wife for help, shortly after his right leg became stiff, then he had a 15 minute tonic clonic episode witnessed by his wife. He was here for vEEG in March 2022. Did not need AED. He has not seen Dr. Morejon in the last 6 months. Pt was advised not to drive until seizure free for 1 year in accordance with [facility] law.   Seizure, generalized tonic clonic - EEG without any events. CTH non acute. Prior MRI reviewed showing Chronic Lacunar infarct and calcification focus. Neurology consult appreciated, started on Keppra and will need follow up with Dr Morejon   Prior CVA on MRI - started on ASA and statin. Cardiology consulted for NUPUR + ILR. Pt underwent NUPUR but refused ILR.   Uncontrolled DM2 - A1c 10. Endo consulted and recommended starting Insulin. DM and Insulin teaching was done.   HLD - c/w statin   RA - maintained on Etanercept     NUPUR:    1. No evidence of valvular vegetations, cardiac masses or thrombus.   2. No left atrial appendage thrombus and normal left atrial appendage velocities.   3. Intact intra-atrial septum without shunt.   4. Color flow doppler and intravenous injection of agitated saline demonstrates the presence of an intact intra atrial septum.   5. Left ventricular ejection fraction, by visual estimation, is 55 to 60%.   6. Normal global left ventricular systolic function.   7. Normal right ventricular size and function.   8. The left atrium is normal in size.   9. Trace mitral valve regurgitation.  10. Normal trileaflet aortic valve with normal opening.  11. There is no evidence of pericardial effusion.  12. There are no prior studies on this patient for comparison purposes.

## 2022-09-20 NOTE — DISCHARGE NOTE PROVIDER - ATTENDING DISCHARGE PHYSICAL EXAMINATION:
Vital Signs Last 24 Hrs  T(C): 36.7 (20 Sep 2022 11:32), Max: 36.7 (20 Sep 2022 04:00)  T(F): 98 (20 Sep 2022 11:32), Max: 98 (20 Sep 2022 04:00)  HR: 67 (20 Sep 2022 04:00) (67 - 82)  BP: 125/81 (20 Sep 2022 11:32) (110/72 - 128/75)  BP(mean): --  RR: 20 (20 Sep 2022 11:32) (18 - 20)  SpO2: 93% (20 Sep 2022 11:32) (93% - 97%)    Parameters below as of 20 Sep 2022 04:00  Patient On (Oxygen Delivery Method): room air    CONSTITUTIONAL: NAD, sitting up in bed  ENMT: Moist oral mucosa, EOMI, bite marks on tongue   RESPIRATORY: Normal respiratory effort; lungs are clear to auscultation bilaterally  CARDIOVASCULAR: Regular rate and rhythm, normal S1 and S2, No lower extremity edema  ABDOMEN: Nontender to palpation, normoactive bowel sounds  MUSCULOSKELETAL:  No clubbing or cyanosis of digits   PSYCH: A+O to person, place, and time; affect appropriate  NEUROLOGY: No gross sensory or motor deficits   SKIN: Warm and dry

## 2022-09-20 NOTE — PROGRESS NOTE ADULT - NS ATTEND AMEND GEN_ALL_CORE FT
Cerebrovascular accident   c/w ASA and Lipitor  MRI in February of 2022 shows a chronic lacunar infarct in the left basal ganglia and a single coarse calcification along the left parietal lobe.  For NUPUR today then loop recorder
hopefully metformin will eliminate need for prandial insulin
Cerebrovascular accident (CVA).   For NUPUR today to determine etiology of CVA  Refused loop today may consider it outpatient

## 2022-09-20 NOTE — PROGRESS NOTE ADULT - SUBJECTIVE AND OBJECTIVE BOX
[facility] PHYSICIAN PARTNERS                                                         CARDIOLOGY AT [facility]                                                                  [address], [place] [address]                                                         Telephone: [phone]. Fax:[phone]                                                                             PROGRESS NOTE    Reason for follow up:   hx of cva  Update:   NUPUR planned for today  NPO  Review of symptoms:   Cardiac:  No chest pain. No dyspnea. No palpitations.  Respiratory: no cough. No dyspnea  Gastrointestinal: No diarrhea. No abdominal pain. No bleeding.   Neuro: No focal neuro complaints.    Vitals:  T(C): 36.7 (09-20-22 @ 04:00), Max: 36.7 (09-19-22 @ 10:31)  HR: 67 (09-20-22 @ 04:00) (65 - 82)  BP: 110/72 (09-20-22 @ 04:00) (110/72 - 130/82)  RR: 18 (09-20-22 @ 04:00) (18 - 18)  SpO2: 97% (09-20-22 @ 04:00) (94% - 97%)    I&O's Summary  Weight (kg): 83.9 (09-16 @ 21:57)    PHYSICAL EXAM:  Appearance: Comfortable. No acute distress  HEENT:  Atraumatic. Normocephalic.  Normal oral mucosa  Neurologic: A & O x 3, no gross focal deficits.  Cardiovascular: RRR S1 S2, No murmur, no rubs/gallops. No JVD  Respiratory: Lungs clear to auscultation, unlabored   Gastrointestinal:  Soft, Non-tender, + BS  Lower Extremities: 2+ Peripheral Pulses, No clubbing, cyanosis, or edema  Psychiatry: Patient is calm. No agitation.   Skin: warm and dry.    CURRENT CARDIAC MEDICATIONS:  CURRENT OTHER MEDICATIONS:  levETIRAcetam 750 milliGRAM(s) Oral two times a day  atorvastatin 40 milliGRAM(s) Oral at bedtime  dextrose 50% Injectable 25 Gram(s) IV Push once, Stop order after: 1 Doses  dextrose 50% Injectable 12.5 Gram(s) IV Push once, Stop order after: 1 Doses  dextrose 50% Injectable 25 Gram(s) IV Push once, Stop order after: 1 Doses  dextrose Oral Gel 15 Gram(s) Oral once, Stop order after: 1 Doses PRN Blood Glucose LESS THAN 70 milliGRAM(s)/deciliter  glucagon  Injectable 1 milliGRAM(s) IntraMuscular once, Stop order after: 1 Doses  insulin glargine Injectable (LANTUS) 15 Unit(s) SubCutaneous at bedtime  insulin lispro (ADMELOG) corrective regimen sliding scale   SubCutaneous three times a day before meals  insulin lispro Injectable (ADMELOG) 6 Unit(s) SubCutaneous three times a day before meals  aspirin enteric coated 81 milliGRAM(s) Oral daily  enoxaparin Injectable 40 milliGRAM(s) SubCutaneous every 24 hours  lactated ringers. 1000 milliLiter(s) (80 mL/Hr) IV Continuous <Continuous>    LABS:	 	  ( 17 Sep 2022 08:53 )  Troponin T  X    ,  CPK  297<H>, CKMB  X    , BNP X        , ( 16 Sep 2022 23:10 )  Troponin T  <0.01,  CPK  198  , CKMB  X    , BNP X                            14.9   7.80  )-----------( 210      ( 19 Sep 2022 06:48 )             44.8     09-19    138  |  102  |  17.4  ----------------------------<  177<H>  3.9   |  27.0  |  0.77    Ca    9.0      19 Sep 2022 06:48    TELEMETRY:  juan carlos

## 2022-09-20 NOTE — PROGRESS NOTE ADULT - ASSESSMENT
ASSESSMENT: 48yo male with siezure-like events X 2 since Jan 2022, vEEG in March 2022 was neg, followed by Dr. Morejon, CT head with no acute territorial infarct demonstrated, small peripheral calcification in the left parietal cortex and tiny calcification within the left occipital lobe (seen on prior CT in Jan), now presents for NUPUR to R/O structural heart disease and intracardiac thrombus.       -NUPUR as ordered  -Labs and ECG reviewed  -Procedure discussed with patient; risks and benefits explained; questions answered  -Consent obtained by Echocardiographer and anesthesiologist ASSESSMENT: 48yo male with siezure-like events X 2 since Jan 2022, vEEG in March 2022 was neg, followed by Dr. Morejon, CT head with no acute territorial infarct demonstrated, small peripheral calcification in the left parietal cortex and tiny calcification within the left occipital lobe (seen on prior CT in Jan), now presents for NUPUR to R/O structural heart disease and intracardiac thrombus.       -UNPUR as ordered  -Labs and ECG reviewed  -Procedure discussed with patient; risks and benefits explained; questions answered  -Consent obtained by Echocardiographer and anesthesiologist    Now s/p NUPUR reportedly without structural abnormality or intracardiac thrombus, tolerated well, NAD and HDS, transfer back to in-pt unit after recovery    -Formal NUPUR report pending  -Post NUPUR management/monitoring per protocol  Transfer back to in-pt unit after recovery

## 2022-09-20 NOTE — DISCHARGE NOTE PROVIDER - NSDCCPCAREPLAN_GEN_ALL_CORE_FT
PRINCIPAL DISCHARGE DIAGNOSIS  Diagnosis: Seizure  Assessment and Plan of Treatment: Seizure, generalized tonic clonic - EEG without any events. CTH non acute. Prior MRI reviewed showing Chronic Lacunar infarct and calcification focus. Neurology consult appreciated, started on Keppra and will need follow up with Dr Morejon      SECONDARY DISCHARGE DIAGNOSES  Diagnosis: Cardioembolic stroke  Assessment and Plan of Treatment: started on ASA and statin. Cardiology consulted for NUPUR + ILR. Pt underwent NUPUR but refused ILR.    Diagnosis: Diabetes mellitus  Assessment and Plan of Treatment: A1c 10. Endo consulted and recommended starting Insulin. DM and Insulin teaching was done.     PRINCIPAL DISCHARGE DIAGNOSIS  Diagnosis: Seizure  Assessment and Plan of Treatment: Seizure, generalized tonic clonic - EEG without any events. CTH non acute. Prior MRI reviewed showing Chronic Lacunar infarct and calcification focus. Neurology consult appreciated, started on Keppra and will need follow up with Dr Morejon.      SECONDARY DISCHARGE DIAGNOSES  Diagnosis: Cardioembolic stroke  Assessment and Plan of Treatment: started on ASA and statin. Cardiology consulted for NUPUR + ILR. Pt underwent NUPUR but refused ILR. can pursue ILR as outpt.    Diagnosis: Diabetes mellitus  Assessment and Plan of Treatment: A1c 10. Endo consulted and recommended starting Insulin. DM and Insulin teaching was done.

## 2022-09-20 NOTE — PROGRESS NOTE ADULT - ASSESSMENT
47 year old male with PMHx HLD, RA, NIDDM, presenting for evaluation s/p seizure episode last night. Patient states was tying a bag of laundry when his right arm suddenly became stiff, so he called his wife for help, shortly after his right leg became stiff, then he had a 15 minute tonic clonic episode witnessed by his wife. He was here for vEEG in March 2022. Did not need AED. He has not seen Dr. Morejon in the last 6 months. Pt was advised not to drive until seizure free for 1 year in accordance with [facility] law.

## 2022-09-20 NOTE — DISCHARGE NOTE PROVIDER - CARE PROVIDERS DIRECT ADDRESSES
[email or web address]

## 2022-09-20 NOTE — PROGRESS NOTE ADULT - SUBJECTIVE AND OBJECTIVE BOX
CC: Follow up diabetes management   INTERVAL HPI/OVERNIGHT EVENTS: No acute events    ROS: Patient denies chest pain, SOB, abd pain, N/V, or any other complaints. All remainder ROS negative.    MEDICATIONS  (STANDING):  aspirin enteric coated 81 milliGRAM(s) Oral daily  atorvastatin 40 milliGRAM(s) Oral at bedtime  dextrose 5%. 1000 milliLiter(s) (100 mL/Hr) IV Continuous <Continuous>  dextrose 5%. 1000 milliLiter(s) (50 mL/Hr) IV Continuous <Continuous>  dextrose 50% Injectable 25 Gram(s) IV Push once  dextrose 50% Injectable 12.5 Gram(s) IV Push once  dextrose 50% Injectable 25 Gram(s) IV Push once  enoxaparin Injectable 40 milliGRAM(s) SubCutaneous every 24 hours  glucagon  Injectable 1 milliGRAM(s) IntraMuscular once  insulin glargine Injectable (LANTUS) 15 Unit(s) SubCutaneous at bedtime  insulin lispro (ADMELOG) corrective regimen sliding scale   SubCutaneous three times a day before meals  insulin lispro Injectable (ADMELOG) 3 Unit(s) SubCutaneous three times a day before meals  lactated ringers. 1000 milliLiter(s) (80 mL/Hr) IV Continuous <Continuous>  levETIRAcetam 750 milliGRAM(s) Oral two times a day  metFORMIN 500 milliGRAM(s) Oral two times a day    MEDICATIONS  (PRN):  dextrose Oral Gel 15 Gram(s) Oral once PRN Blood Glucose LESS THAN 70 milliGRAM(s)/deciliter    Allergies  No Known Allergies    Vital Signs Last 24 Hrs  T(C): 36.7 (20 Sep 2022 13:14), Max: 36.7 (20 Sep 2022 04:00)  T(F): 98 (20 Sep 2022 13:14), Max: 98 (20 Sep 2022 04:00)  HR: 57 (20 Sep 2022 13:14) (57 - 82)  BP: 127/76 (20 Sep 2022 13:14) (110/72 - 128/75)  BP(mean): --  RR: 18 (20 Sep 2022 13:14) (18 - 20)  SpO2: 98% (20 Sep 2022 13:14) (93% - 98%)    Parameters below as of 20 Sep 2022 13:14  Patient On (Oxygen Delivery Method): room air      PHYSICAL EXAM:  General: No apparent distress  Neck: Supple, trachea midline, no thyromegaly  Respiratory: Lungs clear bilaterally, normal rate, effort  Cardiac: +S1, S2, no m/r/g  GI: +BS, soft, non tender, non distended  Extremities: No peripheral edema, no pedal lesions  Neuro: A+O X3, no tremor  Pysch: Affect appropriate   Skin: No acanthosis       LABS:                        14.9   7.80  )-----------( 210      ( 19 Sep 2022 06:48 )             44.8     09-19    138  |  102  |  17.4  ----------------------------<  177<H>  3.9   |  27.0  |  0.77    Ca    9.0      19 Sep 2022 06:48        POCT Blood Glucose.: 109 mg/dL (09-20-22 @ 13:19)  POCT Blood Glucose.: 153 mg/dL (09-20-22 @ 08:18)  POCT Blood Glucose.: 178 mg/dL (09-19-22 @ 21:07)  POCT Blood Glucose.: 169 mg/dL (09-19-22 @ 17:07)

## 2022-09-20 NOTE — DISCHARGE NOTE PROVIDER - PROVIDER TOKENS
PROVIDER:[TOKEN:[[id number]]],PROVIDER:[TOKEN:[[id number]]] PROVIDER:[TOKEN:[[id number]]],PROVIDER:[TOKEN:[[id number]]],PROVIDER:[TOKEN:[[id number]]]

## 2022-09-20 NOTE — DISCHARGE NOTE NURSING/CASE MANAGEMENT/SOCIAL WORK - PATIENT PORTAL LINK FT
You can access the FollowMyHealth Patient Portal offered by [facility] by registering at the following website: [email or web address]. By joining [facility]’s FollowMyHealth portal, you will also be able to view your health information using other applications (apps) compatible with our system.

## 2022-09-20 NOTE — PROGRESS NOTE ADULT - SUBJECTIVE AND OBJECTIVE BOX
Department of Cardiology                                                                  [facility]/[place] [address] [place] [address]                                                            Telephone: [phone]. Fax:[phone]                                                                                     Pre-NUPUR Note        Narrative:        CT Head:    ASA and Mallampati: Per Anesthesia    	  MEDICATIONS:        levETIRAcetam 750 milliGRAM(s) Oral two times a day      atorvastatin 40 milliGRAM(s) Oral at bedtime  dextrose 50% Injectable 25 Gram(s) IV Push once  dextrose 50% Injectable 12.5 Gram(s) IV Push once  dextrose 50% Injectable 25 Gram(s) IV Push once  dextrose Oral Gel 15 Gram(s) Oral once PRN  glucagon  Injectable 1 milliGRAM(s) IntraMuscular once  insulin glargine Injectable (LANTUS) 15 Unit(s) SubCutaneous at bedtime  insulin lispro (ADMELOG) corrective regimen sliding scale   SubCutaneous three times a day before meals  insulin lispro Injectable (ADMELOG) 6 Unit(s) SubCutaneous three times a day before meals    aspirin enteric coated 81 milliGRAM(s) Oral daily  dextrose 5%. 1000 milliLiter(s) IV Continuous <Continuous>  dextrose 5%. 1000 milliLiter(s) IV Continuous <Continuous>  enoxaparin Injectable 40 milliGRAM(s) SubCutaneous every 24 hours  lactated ringers. 1000 milliLiter(s) IV Continuous <Continuous>        PHYSICAL EXAM:    T(C): 36.7 (09-20-22 @ 04:00), Max: 36.7 (09-20-22 @ 04:00)  HR: 67 (09-20-22 @ 04:00) (67 - 82)  BP: 110/72 (09-20-22 @ 04:00) (110/72 - 128/75)  RR: 18 (09-20-22 @ 04:00) (18 - 18)  SpO2: 97% (09-20-22 @ 04:00) (96% - 97%)  Wt(kg): --    I&O's Summary      Daily     Daily     Constitutional: A & O x 3  HEENT:   Normal oral mucosa, PERRL, EOMI	  Cardiovascular: Normal S1 S2, No JVD, No murmurs, No edema  Respiratory: Lungs clear to auscultation	  Gastrointestinal:  Soft, Non-tender, + BS	  Skin: No rashes, No ecchymoses, No cyanosis  Neurologic: Non-focal  Extremities: Normal range of motion, No clubbing, cyanosis or edema  Vascular: Peripheral pulses palpable 2+ bilaterally    TELEMETRY: 	      ECG:  	    Diagnsotics:    LABS:	 	    CARDIAC MARKERS:                                  14.9   7.80  )-----------( 210      ( 19 Sep 2022 06:48 )             44.8     09-19    138  |  102  |  17.4  ----------------------------<  177<H>  3.9   |  27.0  |  0.77    Ca    9.0      19 Sep 2022 06:48      proBNP:   Lipid Profile:   HgA1c:   TSH:     ASSESSMENT:    -NUPUR as ordered  -Labs and ECG reviewed  -Procedure discussed with patient; risks and benefits explained; questions answered  -Consent obtained by Echocardiographer and anesthesiologist                                                                         Department of Cardiology                                                                  [facility]/[place] [address]                                                            Telephone: [phone]. Fax:[phone]                                                                                     Pre-NUPUR Note        Narrative:  47 year old male with PMHx HLD, RA, NIDDM, presenting after witnessed seizure-like event, patient states was tying a bag of laundry when his right arm suddenly became stiff, so he called his wife for help, shortly after his right leg became stiff, then he had a 15 minute tonic clonic episode witnessed by his wife, he had a similar event in Jan 2022 and vEEG in March 2022 was neg, followed by Dr. Morejon, CT head with no acute territorial infarct demonstrated, small peripheral calcification in the left parietal cortex and tiny calcification within the left occipital lobe (seen on prior CT in Jan), now presents for NUPUR to R/O structural heart disease and intracardiac thrombus.       CT Head 9/17/22:  FINDINGS: The ventricles and cortical sulci are within normal limits.   There is no intracranial bleed or extra-axial fluid collection. No mass   effect or midline shift seen. No acute territorial infarct demonstrated.   Small peripheral calcification in the left parietal cortex again seen.   Tiny calcification within the left occipital lobe again seen. The skull   is intact. The imaged portions of the orbits demonstrate a thin right   ocular lens. The imaged paranasal sinuses demonstrate mild patchy ethmoid   sinus thickening and mild left maxillary sinus mucosal thickening.   Partially opacified left frontal sinus again seen. Imaged mastoid air   cells are grossly clear.    IMPRESSION: No acute intracranial bleed, mass effect or depressed skull   fracture.      ASA and Mallampati: Per Anesthesia    	  MEDICATIONS:  levETIRAcetam 750 milliGRAM(s) Oral two times a day  atorvastatin 40 milliGRAM(s) Oral at bedtime  dextrose 50% Injectable 25 Gram(s) IV Push once  dextrose 50% Injectable 12.5 Gram(s) IV Push once  dextrose 50% Injectable 25 Gram(s) IV Push once  dextrose Oral Gel 15 Gram(s) Oral once PRN  glucagon  Injectable 1 milliGRAM(s) IntraMuscular once  insulin glargine Injectable (LANTUS) 15 Unit(s) SubCutaneous at bedtime  insulin lispro (ADMELOG) corrective regimen sliding scale   SubCutaneous three times a day before meals  insulin lispro Injectable (ADMELOG) 6 Unit(s) SubCutaneous three times a day before meals  aspirin enteric coated 81 milliGRAM(s) Oral daily  dextrose 5%. 1000 milliLiter(s) IV Continuous <Continuous>  dextrose 5%. 1000 milliLiter(s) IV Continuous <Continuous>  enoxaparin Injectable 40 milliGRAM(s) SubCutaneous every 24 hours  lactated ringers. 1000 milliLiter(s) IV Continuous <Continuous>        PHYSICAL EXAM:    T(C): 36.7 (09-20-22 @ 04:00), Max: 36.7 (09-20-22 @ 04:00)  HR: 67 (09-20-22 @ 04:00) (67 - 82)  BP: 110/72 (09-20-22 @ 04:00) (110/72 - 128/75)  RR: 18 (09-20-22 @ 04:00) (18 - 18)  SpO2: 97% (09-20-22 @ 04:00) (96% - 97%)    Constitutional: A & O x 3  HEENT:   Normal oral mucosa, PERRL, EOMI	  Cardiovascular: Normal S1 S2, No JVD, No murmurs, No edema  Respiratory: Lungs clear to auscultation	  Gastrointestinal:  Soft, Non-tender, + BS	  Skin: No rashes, No ecchymoses, No cyanosis  Neurologic: Non-focal  Extremities: Normal range of motion, No clubbing, cyanosis or edema  Vascular: Peripheral pulses palpable 2+ bilaterally    ECG:  SR 89BPM with abn TW III	        LABS:	 	                        14.9   7.80  )-----------( 210      ( 19 Sep 2022 06:48 )             44.8     09-19    138  |  102  |  17.4  ----------------------------<  177<H>  3.9   |  27.0  |  0.77    Ca    9.0      19 Sep 2022 06:48

## 2022-09-20 NOTE — CHART NOTE - NSCHARTNOTEFT_GEN_A_CORE
Diabetes Note: Aware consult for diabetes education; a1c 10.1%.  Pt provided with extensive diabetes self management education on (9/19) and agreeable to starting on basal insulin with Metformin.  Reinforced diabetes self management education with patient:     - Discussed a1c results and potential long term complications of poorly managed diabetes   - Discussed cons cho meal plan using the plate method with emphasis on meal timing and appropriate CHO/protein portions (with examples)   - Reinforced importance of BG monitoring and medication regimen (including basal insulin)  - Encouraged outpatient follow up with endocrinologist for continuous diabetes self management (appointment scheduled)    Overall, pt demonstrates good understanding of information reviewed and is agreeable to starting on insulin. Diabetes Note: Aware consult for diabetes education; a1c 10.1%.  Pt provided with extensive diabetes self management education on (9/19) and agreeable to starting on basal insulin with Metformin.  Reinforced diabetes self management education with patient:     - Discussed a1c results and potential long term complications of poorly managed diabetes   - Discussed cons cho meal plan using the plate method with emphasis on meal timing and appropriate CHO/protein portions (with examples)   - Reinforced importance of BG monitoring and medication regimen (including basal insulin)  - Encouraged outpatient follow up with endocrinologist for continuous diabetes self management (appointment scheduled)    Overall, pt demonstrates good understanding of information reviewed and is agreeable to starting on insulin.  All questions answered.  Nutrition literature provided in Tajik per pt preference.  RD CDCES to remain available.

## 2022-09-20 NOTE — PROGRESS NOTE ADULT - PROBLEM SELECTOR PLAN 1
MRI in February of 2022 shows a chronic lacunar infarct in the left basal ganglia and a single coarse calcification along the left parietal lobe.  Continue with aspirin 81mg po daily  Continue with Lipitor 40mg qhs  For NUPUR today to determine etiology of CVA  Refused loop today may consider it outpatient  Discussed with patient and he agrees.
c/w ASA and Lipitor  MRI in February of 2022 shows a chronic lacunar infarct in the left basal ganglia and a single coarse calcification along the left parietal lobe.  For NUPUR today then loop recorder  Discussed with patient and he agrees

## 2022-09-20 NOTE — DISCHARGE NOTE NURSING/CASE MANAGEMENT/SOCIAL WORK - NSDCPEFALRISK_GEN_ALL_CORE
For information on Fall & Injury Prevention, visit: [email or web address] OR  [email or web address] OR  https://www.cdc.gov/steadi/patient.html

## 2022-09-20 NOTE — DISCHARGE NOTE PROVIDER - NSDCFUSCHEDAPPT_GEN_ALL_CORE_FT
Esthela Narayan  [facility] Physician [facility]  END[address]CRIN [address]  Scheduled Appointment: 10/21/2022

## 2022-09-20 NOTE — PROGRESS NOTE ADULT - PROVIDER SPECIALTY LIST ADULT
Endocrinology
Endocrinology
Epilepsy
Intervent Cardiology
Epilepsy
Hospitalist
Hospitalist
Cardiology
Cardiology

## 2022-10-13 NOTE — CDI QUERY NOTE - NSCDIOTHERTXTBX_GEN_ALL_CORE_HH
Per documentation patient was started on ASA and statin. Cardiology consulted for NUPUR + ILR. Pt underwent NUPUR but refused ILR.  Can you please clarify the status of lacunar infarct?  A.	Chronic lacunar infarct is a current condition  B.	Chronic lacunar infarct, history only  C.	Other, please specify  D.	Not clinically significant      Supporting Documentation     Discharge Note Provider [Charted Location: [facility] [id number] 01] [Authored: 20-Sep-2022 11:56]  Hospital Course	  47 year old male with PMHx HLD, RA, NIDDM, presenting for evaluation s/p seizure episode last night. Patient states was tying a bag of laundry when his right arm suddenly became stiff, so he called his wife for help, shortly after his right leg became stiff, then he had a 15 minute tonic clonic episode witnessed by his wife. He was here for vEEG in March 2022. Did not need AED. He has not seen Dr. Morejon in the last 6 months. Pt was advised not to drive until seizure free for 1 year in accordance with [facility] law.   Seizure, generalized tonic clonic - EEG without any events. CTH non acute. Prior MRI reviewed showing Chronic Lacunar infarct and calcification focus. Neurology consult appreciated, started on Keppra and will need follow up with Dr Morejon   Prior CVA on MRI - started on ASA and statin. Cardiology consulted for NUPUR + ILR. Pt underwent NUPUR but refused ILR.
Can you please clarify if the there is a relationship between patient chronic CVA and seizure?  A.	Seizure likely due to chronic lacunar infarct  B.	Seizure not related to chronic lacunar infarct  C.	Other, please specify  D.	Not clinically significant      Supporting documentation:      Progress Note Adult-Endocrinology NP [Charted Location: [place] [address]] [Authored: 20-Sep-2022 15:13]  Assessment and Plan:   • Assessment	  47 year old male with PMHx HLD, RA, NIDDM, presenting for evaluation s/p seizure episode. Pt has MRI earlier this year that showed chronic lacunar infarct left lentiform nucleus and very mild small vessel ischemic changes.      Consult Note Adult-Cardiology Nurse Practitioner/Attending [Charted Location: [place] [address]] [Authored: 18-Sep-2022 14:16]  Additional comments: Seizure, generalized tonic clonic: continue with cvEEG   CTH non acute. Prior MRI reviewed showing Chronic Lacunar infarct and calcification focus   Neurology consult appreciated   Prior CVA on MRI: c/w ASA and statin  Cardiology evaluation for NUPUR + ILR   Cardioembolic stroke.   will check NUPUR in AM r/o cardioembolic stroke and possible ILR before discharge

## 2022-10-21 ENCOUNTER — RESULT CHARGE (OUTPATIENT)
Age: 47
End: 2022-10-21

## 2022-10-21 ENCOUNTER — APPOINTMENT (OUTPATIENT)
Dept: ENDOCRINOLOGY | Facility: CLINIC | Age: 47
End: 2022-10-21

## 2022-10-21 VITALS
WEIGHT: 185 LBS | BODY MASS INDEX: 29.73 KG/M2 | DIASTOLIC BLOOD PRESSURE: 84 MMHG | SYSTOLIC BLOOD PRESSURE: 130 MMHG | HEIGHT: 66 IN

## 2022-10-21 LAB — GLUCOSE BLDC GLUCOMTR-MCNC: 238

## 2022-10-21 PROCEDURE — 99214 OFFICE O/P EST MOD 30 MIN: CPT

## 2022-10-21 PROCEDURE — 82962 GLUCOSE BLOOD TEST: CPT

## 2022-10-21 PROCEDURE — G0108 DIAB MANAGE TRN  PER INDIV: CPT

## 2022-10-21 RX ORDER — 70%ISOPROPYL ALCOHOL 0.7 ML/ML
70 SWAB TOPICAL
Qty: 100 | Refills: 0 | Status: ACTIVE | COMMUNITY
Start: 2022-09-19

## 2022-10-21 RX ORDER — BETAMETHASONE DIPROPIONATE 0.5 MG/G
0.05 CREAM, AUGMENTED TOPICAL
Qty: 50 | Refills: 0 | Status: ACTIVE | COMMUNITY
Start: 2022-06-15

## 2022-10-21 NOTE — ASSESSMENT
[FreeTextEntry1] : T2DM\par -New pt presents with exisitng type 2 diabetes as a HFU. Pt admits he wasn’t doing the right thing with his diabetes, unmedicated and eating poorly but now motivated to do well. \par -Pt doing much better since hospital discharge but was not taking Novolog properly\par -Begin Farxiga 5 mg daily\par -Continue Basaglar 15 units QHS\par -Continue  mg BID\par -Discontinue Novolog\par -Ultimate goal of eliminating insulin entirely\par -Discussed blood sugar goals and parameters, logs given. Bring logs to all apts\par -Meeting with CDE after my apt for diet discussion\par -Increase exercise as tolerated, goal of 30 mins a day 5x aweek\par -Continue to follow up with all specialists including ophtho \par \par \par HLD\par -Continue statin\par \par \par RTO 6-8 weeks for blood sugar review

## 2022-10-21 NOTE — HISTORY OF PRESENT ILLNESS
[FreeTextEntry1] : HFU:\par 47 year old male with PMHx HLD, RA, NIDDM, presenting for evaluation s/p \par seizure episode last night. Patient states was tying a bag of laundry when his \par right arm suddenly became stiff, so he called his wife for help, shortly after \par his right leg became stiff, then he had a 15 minute tonic clonic episode \par witnessed by his wife. He was here for vEEG in March 2022. Did not need AED. He \par has not seen Dr. Morejon in the last 6 months. Pt was advised not to drive until \par seizure free for 1 year in accordance with [facility] law. \par Seizure, generalized tonic clonic - EEG without any events. CTH non acute. \par Prior MRI reviewed showing Chronic Lacunar infarct and calcification focus. \par Neurology consult appreciated, started on Keppra and will need follow up with \par Dr Morejon \par Prior CVA on MRI - started on ASA and statin. Cardiology consulted for NUPUR + \par ILR. Pt underwent NUPUR but refused ILR. \par Uncontrolled DM2 - A1c 10. Endo consulted and recommended starting Insulin. DM \par and Insulin teaching was done. \par HLD - c/w statin \par RA - maintained on Etanercept \par \par Pt thinks he had the seizure because his blood sugar was high (on no medications) during the episodes (Jan and Sept)\par \par T2DM\par Diagnosed in 2016 , was preDM prior to that\par Never took it seriously \par Was prescribed MFN but didn’t really take it \par Was previously controlled by PCP\par \par SMBG\par Checks BS 3X a day\par Fasting- 97 (80s, 90s, low 100s)\par 1 pm- pre lunch- 120s \par Pre dinner- 117s\par \par FS in office 238- took his insulin after he ate breakfast \par \par Current drug regimen\par Novolog 6 units TID meals (was taking after meals)\par Basgalar 15 units \par Metformin 500 mg BID\par \par Eye exam: does follow with ophtho - denies DR\par Foot exam- denies /does not see podiatry/denies numbness/tingling in b/l feet \par Kidney disease: denies\par Heart disease: denies \par \par Weight: stable\par Diet: wife cooks\par small portion of rice, chicken, beans\par Drinks water \par Exercise: on feet all day- works as  \par Smoking : denies \par \par \par HLD\par -Need updated lipid panel\par -Atorvastatin 40 mg daily

## 2022-10-21 NOTE — REVIEW OF SYSTEMS
[Fatigue] : no fatigue [Recent Weight Gain (___ Lbs)] : no recent weight gain [Recent Weight Loss (___ Lbs)] : no recent weight loss [Visual Field Defect] : no visual field defect [Blurred Vision] : no blurred vision [Dysphagia] : no dysphagia [Neck Pain] : no neck pain [Dysphonia] : no dysphonia [Chest Pain] : no chest pain [Shortness Of Breath] : no shortness of breath [Constipation] : no constipation [Diarrhea] : no diarrhea [Polyuria] : no polyuria [Depression] : no depression

## 2022-11-01 ENCOUNTER — APPOINTMENT (OUTPATIENT)
Dept: NEUROLOGY | Facility: CLINIC | Age: 47
End: 2022-11-01

## 2022-11-15 ENCOUNTER — TRANSCRIPTION ENCOUNTER (OUTPATIENT)
Age: 47
End: 2022-11-15

## 2022-11-15 RX ORDER — METFORMIN HYDROCHLORIDE 500 MG/1
500 TABLET, COATED ORAL
Qty: 60 | Refills: 0 | Status: DISCONTINUED | COMMUNITY
Start: 2022-09-19 | End: 2022-11-15

## 2022-12-30 ENCOUNTER — APPOINTMENT (OUTPATIENT)
Dept: ENDOCRINOLOGY | Facility: CLINIC | Age: 47
End: 2022-12-30
Payer: COMMERCIAL

## 2022-12-30 VITALS
WEIGHT: 180 LBS | DIASTOLIC BLOOD PRESSURE: 62 MMHG | BODY MASS INDEX: 30.73 KG/M2 | OXYGEN SATURATION: 99 % | SYSTOLIC BLOOD PRESSURE: 118 MMHG | HEART RATE: 71 BPM | HEIGHT: 64 IN

## 2022-12-30 LAB — GLUCOSE BLDC GLUCOMTR-MCNC: 125

## 2022-12-30 PROCEDURE — 82962 GLUCOSE BLOOD TEST: CPT | Mod: NC

## 2022-12-30 PROCEDURE — 99214 OFFICE O/P EST MOD 30 MIN: CPT | Mod: 25

## 2022-12-30 RX ORDER — LANCETS
EACH MISCELLANEOUS
Qty: 4 | Refills: 0 | Status: ACTIVE | COMMUNITY
Start: 2022-11-21 | End: 1900-01-01

## 2022-12-30 RX ORDER — BLOOD SUGAR DIAGNOSTIC
STRIP MISCELLANEOUS
Qty: 4 | Refills: 1 | Status: ACTIVE | COMMUNITY
Start: 2022-09-19 | End: 1900-01-01

## 2022-12-30 RX ORDER — INSULIN ASPART 100 [IU]/ML
100 INJECTION, SOLUTION INTRAVENOUS; SUBCUTANEOUS
Qty: 15 | Refills: 0 | Status: DISCONTINUED | COMMUNITY
Start: 2022-09-19 | End: 2022-12-30

## 2022-12-30 NOTE — ASSESSMENT
[FreeTextEntry1] : T2DM\par -New pt presents with exisitng type 2 diabetes as a HFU. Pt admits he wasn’t doing the right thing with his diabetes, unmedicated and eating poorly but now motivated to do well. Improvement noted since intial visit. \par -Continue Farxiga 5 mg daily\par -Decrease Basaglar 10 units QHS\par -Continue  mg BID\par -Ultimate goal of eliminating insulin entirely\par -Discussed blood sugar goals and parameters, logs given. Bring logs to all apts\par -Continue to make good food choices\par -Increase exercise as tolerated, goal of 30 mins a day 5x aweek\par -Continue to follow up with all specialists including ophtho \par \par \par HLD\par -Continue statin\par \par Fasting labs due 1/2023 \par RTO 6 weeks

## 2022-12-30 NOTE — REVIEW OF SYSTEMS
[Recent Weight Gain (___ Lbs)] : recent weight gain: [unfilled] lbs [Fatigue] : no fatigue [Recent Weight Loss (___ Lbs)] : no recent weight loss [Visual Field Defect] : no visual field defect [Blurred Vision] : no blurred vision [Dysphagia] : no dysphagia [Neck Pain] : no neck pain [Dysphonia] : no dysphonia [Chest Pain] : no chest pain [Shortness Of Breath] : no shortness of breath [Constipation] : no constipation [Diarrhea] : no diarrhea [Polyuria] : no polyuria [Polydipsia] : no polydipsia

## 2022-12-30 NOTE — HISTORY OF PRESENT ILLNESS
[FreeTextEntry1] : T2DM\par Diagnosed in 2016 , was preDM prior to that\par Never took it seriously \par Was prescribed MFN but didn’t really take it \par Was previously controlled by PCP\par \par SMBG\par Checks BS 3X a day\par On recall all low 100s, rare mid 100s\par \par FS in office 125- had coffee\par \par Current drug regimen\par Basgalar 16 units QHS \par Metformin 500 mg BID\par Farxiga 5 mg daily\par \par Eye exam: does follow with ophtho - denies DR\par Foot exam- denies /does not see podiatry/denies numbness/tingling in b/l feet \par Kidney disease: denies\par Heart disease: denies \par \par Weight: has lost 5 lbs since October\par Diet: wife cooks\par small portion of rice, chicken, beans\par Drinks water \par Exercise: on feet all day- works as  \par Smoking : denies \par \par \par HLD\par -Need updated lipid panel\par -Atorvastatin 40 mg daily

## 2023-01-17 ENCOUNTER — RX RENEWAL (OUTPATIENT)
Age: 48
End: 2023-01-17

## 2023-01-18 ENCOUNTER — RX RENEWAL (OUTPATIENT)
Age: 48
End: 2023-01-18

## 2023-01-23 NOTE — PATIENT PROFILE ADULT - CAREGIVER NAME
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) [R91.8]     Postoperative Diagnosis:  same    Procedure:robotic left thoracoscopy (VATS) upper lobe wedge with lymph node dissection    Surgeon:  Surgeon(s) and Role:     * Eliu Garcia MD - Primary     Assistant:  Lizbet Montgomery PA-C    Assistant Tasks: opening, closing and specimen retrieval     Anesthesia:  General    Anesthesia Staff:   Anesthesiologist: Leon Barr MD  Anesthesia Technician: Julita Chen    
Pat Graves

## 2023-02-02 LAB
HBA1C MFR BLD HPLC: 7.3
LDLC SERPL DIRECT ASSAY-MCNC: 136
MICROALBUMIN/CREAT 24H UR-RTO: 16.4

## 2023-03-03 ENCOUNTER — APPOINTMENT (OUTPATIENT)
Dept: ENDOCRINOLOGY | Facility: CLINIC | Age: 48
End: 2023-03-03
Payer: COMMERCIAL

## 2023-03-03 VITALS
SYSTOLIC BLOOD PRESSURE: 130 MMHG | HEIGHT: 64 IN | HEART RATE: 65 BPM | OXYGEN SATURATION: 99 % | WEIGHT: 184 LBS | BODY MASS INDEX: 31.41 KG/M2 | DIASTOLIC BLOOD PRESSURE: 82 MMHG

## 2023-03-03 LAB — GLUCOSE BLDC GLUCOMTR-MCNC: 97

## 2023-03-03 PROCEDURE — 82962 GLUCOSE BLOOD TEST: CPT

## 2023-03-03 PROCEDURE — 99214 OFFICE O/P EST MOD 30 MIN: CPT | Mod: 25

## 2023-03-03 NOTE — HISTORY OF PRESENT ILLNESS
[FreeTextEntry1] : T2DM\par Diagnosed in 2016 , was preDM prior to that\par Never took it seriously \par Was prescribed MFN but didn’t really take it \par Was previously controlled by PCP\par \par SMBG\par Checks BS 3X a day\par On recall all low 100s, rare mid 100s\par \par FS in office 97\par HGA1C 7.3 - 1/2023 \par \par Current drug regimen\par Basgalar 10 units QHS \par Metformin 500 mg BID\par Farxiga 5 mg daily\par \par Eye exam: lv fall 2022 - denies DR\par Foot exam- denies /does not see podiatry/denies numbness/tingling in b/l feet \par Kidney disease: denies\par Heart disease: denies \par \par Weight: relatively stable\par Diet: wife cooks\par small portion of rice, chicken, beans\par Drinks water \par Exercise: on feet all day- works as  \par Smoking : denies \par \par \par HLD\par -1/2023- Total cholesterol 216, Triglycerides 180, HDL 44, \par -Atorvastatin 40 mg daily - has not had for 2 months \par \par \par Vit D Def\par --History of low vit d- just ran out of 50,000 IU supplement

## 2023-03-03 NOTE — ASSESSMENT
[FreeTextEntry1] : T2DM\par -New pt presents with exisitng type 2 diabetes as a HFU. Pt admits he wasn’t doing the right thing with his diabetes, unmedicated and eating poorly but now motivated to do well. Improvement noted since intial visit. \par -Continue Farxiga 5 mg daily\par -Decrease Basaglar 6 units QHS- goal of eliminating\par -Continue  mg BID\par -Discussed blood sugar goals and parameters, logs given. Bring logs to all apts\par -Continue to make good food choices\par -Increase exercise as tolerated, goal of 30 mins a day 5x aweek\par -Continue to follow up with all specialists including ophtho \par \par \par HLD\par -Restart statin, lipid panel higher then goal but has no had meds for several months\par \par Vit D Def\par -Conitnue weekly 50,000 IU supplement, need levels with next labs\par \par Fasting labs due 6/2023 \par RTO 3 months

## 2023-03-07 ENCOUNTER — RX RENEWAL (OUTPATIENT)
Age: 48
End: 2023-03-07

## 2023-03-17 RX ORDER — DAPAGLIFLOZIN 5 MG/1
5 TABLET, FILM COATED ORAL
Qty: 90 | Refills: 1 | Status: DISCONTINUED | COMMUNITY
Start: 2022-10-21 | End: 2023-03-17

## 2023-03-21 ENCOUNTER — NON-APPOINTMENT (OUTPATIENT)
Age: 48
End: 2023-03-21

## 2023-04-05 RX ORDER — ATORVASTATIN CALCIUM 40 MG/1
40 TABLET, FILM COATED ORAL
Qty: 90 | Refills: 1 | Status: DISCONTINUED | COMMUNITY
Start: 2022-09-19 | End: 2023-04-05

## 2023-05-22 ENCOUNTER — RX RENEWAL (OUTPATIENT)
Age: 48
End: 2023-05-22

## 2023-06-02 ENCOUNTER — RESULT CHARGE (OUTPATIENT)
Age: 48
End: 2023-06-02

## 2023-06-02 ENCOUNTER — APPOINTMENT (OUTPATIENT)
Dept: ENDOCRINOLOGY | Facility: CLINIC | Age: 48
End: 2023-06-02
Payer: COMMERCIAL

## 2023-06-02 VITALS
DIASTOLIC BLOOD PRESSURE: 78 MMHG | WEIGHT: 180 LBS | BODY MASS INDEX: 30.73 KG/M2 | HEIGHT: 64 IN | HEART RATE: 75 BPM | SYSTOLIC BLOOD PRESSURE: 118 MMHG | OXYGEN SATURATION: 96 %

## 2023-06-02 LAB — GLUCOSE BLDC GLUCOMTR-MCNC: 121

## 2023-06-02 PROCEDURE — 82962 GLUCOSE BLOOD TEST: CPT

## 2023-06-02 PROCEDURE — 99214 OFFICE O/P EST MOD 30 MIN: CPT | Mod: 25

## 2023-06-02 RX ORDER — HYDROCORTISONE 10 MG/G
1 CREAM TOPICAL
Qty: 28 | Refills: 0 | Status: DISCONTINUED | COMMUNITY
Start: 2022-09-13 | End: 2023-06-02

## 2023-06-02 NOTE — ASSESSMENT
[FreeTextEntry1] : T2DM\par -New pt presents with exisitng type 2 diabetes as a HFU. Pt admits he wasn’t doing the right thing with his diabetes, unmedicated and eating poorly but now motivated to do well. Improvement noted since intial visit. \par -Continue Jardiance 10 mg daily\par -Continue  mg BID\par -Eliminate insulin \par -Discussed blood sugar goals and parameters, logs given. Bring logs to all apts\par -Continue to make good food choices\par -Increase exercise as tolerated, goal of 30 mins a day 5x aweek\par -Continue to follow up with all specialists including ophtho \par \par \par HLD\par -Continue rousvastatin, need updated lipid panel 7/2023 \par \par Vit D Def\par -Continue weekly 50,000 IU supplement, need levels with next labs\par \par Vit B elevated\par -Continue newly QOD supplementation\par \par Fasting labs due 7/2023 \par RTO 3 months

## 2023-06-02 NOTE — HISTORY OF PRESENT ILLNESS
[FreeTextEntry1] : Pt doing well\par \par T2DM\par Diagnosed in 2016 , was preDM prior to that\par Never took it seriously \par Was prescribed MFN but didn’t really take it \par Was previously controlled by PCP\par \par SMBG\par Checks BS 3X a day\par On recall all low 100s, rare mid 100s\par \par FS in office 121-had a sandwhich \par \par HGA1C 7.2-4/2023\par \par Current drug regimen\par Basgalar 6 units QHS \par Metformin 500 mg BID\par Jardaince 10 mg daily- started late 3/2023 \par \par Eye exam: lv fall 2022 - denies DR\par Foot exam- denies /does not see podiatry/denies numbness/tingling in b/l feet \par Kidney disease: denies\par Heart disease: denies \par \par Weight: has lost 4 lbs\par Diet: wife cooks\par small portion of rice, chicken, beans\par Drinks water \par Exercise: on feet all day- works as  \par Smoking : denies \par \par \par HLD\par -4/2023- Total cholesterol 211, Triglycerides 138, HDL 42, \par -Was on no statin when these labs resulted , since then on rosuvastatin 5 mg daily\par  \par Vit D Def\par --WNL on 4/2023 labs\par -On 50,000 IU supplement \par \par Vit B high on last labs\par -Has since switched supplementation to QOD

## 2023-06-15 ENCOUNTER — RX RENEWAL (OUTPATIENT)
Age: 48
End: 2023-06-15

## 2023-09-01 ENCOUNTER — APPOINTMENT (OUTPATIENT)
Dept: ENDOCRINOLOGY | Facility: CLINIC | Age: 48
End: 2023-09-01

## 2023-09-11 ENCOUNTER — RX RENEWAL (OUTPATIENT)
Age: 48
End: 2023-09-11

## 2023-10-13 ENCOUNTER — RX RENEWAL (OUTPATIENT)
Age: 48
End: 2023-10-13

## 2023-11-02 ENCOUNTER — APPOINTMENT (OUTPATIENT)
Dept: GASTROENTEROLOGY | Facility: CLINIC | Age: 48
End: 2023-11-02
Payer: COMMERCIAL

## 2023-11-02 VITALS
HEART RATE: 71 BPM | SYSTOLIC BLOOD PRESSURE: 115 MMHG | HEIGHT: 64 IN | DIASTOLIC BLOOD PRESSURE: 72 MMHG | WEIGHT: 180 LBS | OXYGEN SATURATION: 97 % | BODY MASS INDEX: 30.73 KG/M2 | RESPIRATION RATE: 15 BRPM

## 2023-11-02 DIAGNOSIS — A04.8 OTHER SPECIFIED BACTERIAL INTESTINAL INFECTIONS: ICD-10-CM

## 2023-11-02 PROCEDURE — 99204 OFFICE O/P NEW MOD 45 MIN: CPT

## 2023-11-02 RX ORDER — LEVETIRACETAM 750 MG/1
750 TABLET, FILM COATED ORAL
Qty: 60 | Refills: 0 | Status: DISCONTINUED | COMMUNITY
Start: 2022-09-19 | End: 2023-11-02

## 2023-11-02 RX ORDER — INSULIN GLARGINE 100 [IU]/ML
100 INJECTION, SOLUTION SUBCUTANEOUS DAILY
Qty: 1 | Refills: 0 | Status: DISCONTINUED | COMMUNITY
Start: 2022-09-19 | End: 2023-11-02

## 2023-11-02 RX ORDER — ETANERCEPT 25 MG/.5ML
25 SOLUTION SUBCUTANEOUS
Refills: 0 | Status: ACTIVE | COMMUNITY

## 2023-12-08 ENCOUNTER — APPOINTMENT (OUTPATIENT)
Dept: ENDOCRINOLOGY | Facility: CLINIC | Age: 48
End: 2023-12-08
Payer: COMMERCIAL

## 2023-12-08 PROCEDURE — 99214 OFFICE O/P EST MOD 30 MIN: CPT | Mod: 95

## 2023-12-13 ENCOUNTER — OFFICE (OUTPATIENT)
Dept: URBAN - METROPOLITAN AREA CLINIC 94 | Facility: CLINIC | Age: 48
Setting detail: OPHTHALMOLOGY
End: 2023-12-13
Payer: COMMERCIAL

## 2023-12-13 DIAGNOSIS — H01.005: ICD-10-CM

## 2023-12-13 DIAGNOSIS — H26.491: ICD-10-CM

## 2023-12-13 DIAGNOSIS — H01.002: ICD-10-CM

## 2023-12-13 DIAGNOSIS — E11.3293: ICD-10-CM

## 2023-12-13 DIAGNOSIS — H25.12: ICD-10-CM

## 2023-12-13 DIAGNOSIS — H35.40: ICD-10-CM

## 2023-12-13 DIAGNOSIS — H35.373: ICD-10-CM

## 2023-12-13 PROCEDURE — 92235 FLUORESCEIN ANGRPH MLTIFRAME: CPT | Performed by: OPHTHALMOLOGY

## 2023-12-13 PROCEDURE — 92134 CPTRZ OPH DX IMG PST SGM RTA: CPT | Performed by: OPHTHALMOLOGY

## 2023-12-13 PROCEDURE — 92014 COMPRE OPH EXAM EST PT 1/>: CPT | Performed by: OPHTHALMOLOGY

## 2023-12-13 ASSESSMENT — REFRACTION_MANIFEST
OS_CYLINDER: -0.50
OD_VA1: 20/20
OD_AXIS: 015
OD_AXIS: 020
OS_VA1: 20/20
OD_VA2: 20/20(J1+)
OD_CYLINDER: -0.25
OS_VA1: 20/25
OS_CYLINDER: -0.50
OS_SPHERE: PLANO
OS_VA2: 20/20(J1+)
OS_AXIS: 175
OS_ADD: +1.50
OD_SPHERE: -1.00
OD_SPHERE: -1.00
OD_ADD: +2.25
OS_SPHERE: -0.25
OD_CYLINDER: -0.25
OS_ADD: +1.50
OD_VA1: 20/20
OS_AXIS: 180
OD_ADD: +2.50

## 2023-12-13 ASSESSMENT — REFRACTION_CURRENTRX
OS_VPRISM_DIRECTION: BF
OD_SPHERE: -1.00
OD_AXIS: 015
OS_ADD: +1.50
OD_ADD: +2.50
OS_OVR_VA: 20/
OD_OVR_VA: 20/
OD_VPRISM_DIRECTION: BF
OS_SPHERE: -0.25
OD_CYLINDER: -0.25
OS_AXIS: 000
OS_CYLINDER: 0.00

## 2023-12-13 ASSESSMENT — SPHEQUIV_DERIVED
OD_SPHEQUIV: -1.5
OS_SPHEQUIV: 0.125
OD_SPHEQUIV: -1.125
OS_SPHEQUIV: -0.5
OD_SPHEQUIV: -1.125

## 2023-12-13 ASSESSMENT — LID EXAM ASSESSMENTS
OD_MEIBOMITIS: T
OD_BLEPHARITIS: 1+
OS_BLEPHARITIS: 1+
OS_MEIBOMITIS: T

## 2023-12-13 ASSESSMENT — CONFRONTATIONAL VISUAL FIELD TEST (CVF)
OS_FINDINGS: FULL
OD_FINDINGS: FULL

## 2023-12-13 ASSESSMENT — REFRACTION_AUTOREFRACTION
OD_AXIS: 000
OD_SPHERE: -1.50
OD_CYLINDER: 0.00
OS_CYLINDER: -0.25
OS_SPHERE: +0.25
OS_AXIS: 008

## 2023-12-22 ENCOUNTER — NON-APPOINTMENT (OUTPATIENT)
Age: 48
End: 2023-12-22

## 2023-12-22 RX ORDER — ERGOCALCIFEROL 1.25 MG/1
1.25 MG CAPSULE ORAL
Qty: 13 | Refills: 0 | Status: ACTIVE | COMMUNITY
Start: 2023-03-03 | End: 1900-01-01

## 2024-01-31 RX ORDER — POLYETHYLENE GLYCOL-3350 AND ELECTROLYTES WITH FLAVOR PACK 240; 5.84; 2.98; 6.72; 22.72 G/278.26G; G/278.26G; G/278.26G; G/278.26G; G/278.26G
240 POWDER, FOR SOLUTION ORAL
Qty: 1 | Refills: 0 | Status: ACTIVE | COMMUNITY
Start: 2023-11-02 | End: 1900-01-01

## 2024-02-12 ENCOUNTER — TRANSCRIPTION ENCOUNTER (OUTPATIENT)
Age: 49
End: 2024-02-12

## 2024-02-13 ENCOUNTER — APPOINTMENT (OUTPATIENT)
Dept: GASTROENTEROLOGY | Facility: GI CENTER | Age: 49
End: 2024-02-13
Payer: COMMERCIAL

## 2024-02-13 ENCOUNTER — RESULT REVIEW (OUTPATIENT)
Age: 49
End: 2024-02-13

## 2024-02-13 ENCOUNTER — OUTPATIENT (OUTPATIENT)
Dept: OUTPATIENT SERVICES | Facility: HOSPITAL | Age: 49
LOS: 1 days | End: 2024-02-13
Payer: COMMERCIAL

## 2024-02-13 DIAGNOSIS — Z12.11 ENCOUNTER FOR SCREENING FOR MALIGNANT NEOPLASM OF COLON: ICD-10-CM

## 2024-02-13 DIAGNOSIS — R58 HEMORRHAGE, NOT ELSEWHERE CLASSIFIED: ICD-10-CM

## 2024-02-13 LAB — GLUCOSE BLDC GLUCOMTR-MCNC: 134 MG/DL — HIGH (ref 70–99)

## 2024-02-13 PROCEDURE — 82962 GLUCOSE BLOOD TEST: CPT

## 2024-02-13 PROCEDURE — 88305 TISSUE EXAM BY PATHOLOGIST: CPT | Mod: 26

## 2024-02-13 PROCEDURE — 45385 COLONOSCOPY W/LESION REMOVAL: CPT | Mod: PT

## 2024-02-13 PROCEDURE — 88305 TISSUE EXAM BY PATHOLOGIST: CPT

## 2024-02-13 PROCEDURE — 45380 COLONOSCOPY AND BIOPSY: CPT | Mod: PT,XS

## 2024-02-13 NOTE — PHYSICAL EXAM
[Alert] : alert [Normal Voice/Communication] : normal voice/communication [Healthy Appearing] : healthy appearing [No Acute Distress] : no acute distress [Sclera] : the sclera and conjunctiva were normal [Hearing Threshold Finger Rub Not Rich] : hearing was normal [Normal Lips/Gums] : the lips and gums were normal [Normal Appearance] : the appearance of the neck was normal [Oropharynx] : the oropharynx was normal [No Respiratory Distress] : no respiratory distress [No Acc Muscle Use] : no accessory muscle use [Auscultation Breath Sounds / Voice Sounds] : lungs were clear to auscultation bilaterally [Respiration, Rhythm And Depth] : normal respiratory rhythm and effort [Heart Rate And Rhythm] : heart rate was normal and rhythm regular [Normal S1, S2] : normal S1 and S2 [Murmurs] : no murmurs [Bowel Sounds] : normal bowel sounds [Abdomen Tenderness] : non-tender [No Masses] : no abdominal mass palpated [Abdomen Soft] : soft [] : no hepatosplenomegaly [Oriented To Time, Place, And Person] : oriented to person, place, and time

## 2024-02-20 LAB — SURGICAL PATHOLOGY STUDY: SIGNIFICANT CHANGE UP

## 2024-02-22 ENCOUNTER — RX RENEWAL (OUTPATIENT)
Age: 49
End: 2024-02-22

## 2024-03-07 LAB
HBA1C MFR BLD HPLC: 8.7
LDLC SERPL DIRECT ASSAY-MCNC: 95
MICROALBUMIN/CREAT 24H UR-RTO: 18

## 2024-03-11 LAB — UREA BREATH TEST QL: NEGATIVE

## 2024-04-22 ENCOUNTER — RX RENEWAL (OUTPATIENT)
Age: 49
End: 2024-04-22

## 2024-05-17 ENCOUNTER — APPOINTMENT (OUTPATIENT)
Dept: ENDOCRINOLOGY | Facility: CLINIC | Age: 49
End: 2024-05-17
Payer: COMMERCIAL

## 2024-05-17 VITALS
SYSTOLIC BLOOD PRESSURE: 110 MMHG | BODY MASS INDEX: 30.73 KG/M2 | HEART RATE: 70 BPM | HEIGHT: 64 IN | WEIGHT: 180 LBS | DIASTOLIC BLOOD PRESSURE: 60 MMHG | OXYGEN SATURATION: 97 %

## 2024-05-17 DIAGNOSIS — R74.8 ABNORMAL LEVELS OF OTHER SERUM ENZYMES: ICD-10-CM

## 2024-05-17 DIAGNOSIS — E55.9 VITAMIN D DEFICIENCY, UNSPECIFIED: ICD-10-CM

## 2024-05-17 DIAGNOSIS — E11.9 TYPE 2 DIABETES MELLITUS W/OUT COMPLICATIONS: ICD-10-CM

## 2024-05-17 DIAGNOSIS — E78.5 HYPERLIPIDEMIA, UNSPECIFIED: ICD-10-CM

## 2024-05-17 LAB — GLUCOSE BLDC GLUCOMTR-MCNC: 175

## 2024-05-17 PROCEDURE — 99214 OFFICE O/P EST MOD 30 MIN: CPT

## 2024-05-17 PROCEDURE — 82962 GLUCOSE BLOOD TEST: CPT

## 2024-05-17 RX ORDER — FLUCONAZOLE 200 MG/1
200 TABLET ORAL
Qty: 3 | Refills: 0 | Status: DISCONTINUED | COMMUNITY
Start: 2022-04-28 | End: 2024-05-17

## 2024-05-17 RX ORDER — ELECTROLYTES/DEXTROSE
32G X 4 MM SOLUTION, ORAL ORAL
Qty: 4 | Refills: 0 | Status: DISCONTINUED | COMMUNITY
Start: 2022-09-19 | End: 2024-05-17

## 2024-05-17 NOTE — ASSESSMENT
[Diabetes Foot Care] : diabetes foot care [Long Term Vascular Complications] : long term vascular complications of diabetes [Carbohydrate Consistent Diet] : carbohydrate consistent diet [Retinopathy Screening] : Patient was referred to ophthalmology for retinopathy screening [FreeTextEntry1] : T2DM -New pt presents with exisitng type 2 diabetes as a HFU. Pt admits he wasn't doing the right thing with his diabetes, unmedicated and eating poorly but now motivated to do well. Improvement noted since initial visit. -Continue Jardiance 10 mg daily -Continue  mg BID -Discussed blood sugar goals and parameters, logs given. Bring logs to all apts inue to make good food choices -Increase exercise as tolerated, goal of 30 mins a day 5x aweek -Continue to follow up with all specialists including ophtho   HLD -Continue rousvastatin, need updated lipid panel   Vit D Def -On no supplement, need levels with next labs   Vit B elevated in past -Continue on no supplement, need updated levels with labs    RTO 3 months with NP and 6 months MD

## 2024-05-17 NOTE — HISTORY OF PRESENT ILLNESS
[FreeTextEntry1] : Interval Hx: feeling good no recent labs   T2DM Diagnosed in 2016 , was preDM prior to that Never took it seriously Was prescribed MFN but didn't really take it Was previously controlled by PCP  SMBG occasional   Current FS: 175 before visit ate chinese  Need updated HGA1C  Current drug regimen Has been off insulin since last visit Metformin  mg BID Jardaince 10 mg daily- started late 3/2023  Eye exam:  2/2024 , next visit next week - denies DR Foot exam- denies /does not see podiatry/denies numbness/tingling in b/l feet Kidney disease: denies Heart disease: denies  Weight: stable Diet: wife cooks small portion of rice, chicken, beans Drinks water Exercise: on feet all day- works as  Smoking : denies   HLD Need updated lipid panel -On rosuvastatin 5 mg daily  Vit D Def --Need updated levels with labs -Ran out of supplement  Vit B high on last labs -Ran out of supplementation, need updated levels with labs

## 2024-05-30 RX ORDER — METFORMIN ER 500 MG 500 MG/1
500 TABLET ORAL
Qty: 180 | Refills: 2 | Status: ACTIVE | COMMUNITY
Start: 2023-05-22 | End: 1900-01-01

## 2024-05-30 RX ORDER — ROSUVASTATIN CALCIUM 10 MG/1
10 TABLET, FILM COATED ORAL
Qty: 90 | Refills: 1 | Status: ACTIVE | COMMUNITY
Start: 2023-04-05 | End: 1900-01-01

## 2024-05-30 RX ORDER — EMPAGLIFLOZIN 25 MG/1
25 TABLET, FILM COATED ORAL DAILY
Qty: 90 | Refills: 1 | Status: ACTIVE | COMMUNITY
Start: 2023-03-17 | End: 1900-01-01

## 2024-08-30 ENCOUNTER — APPOINTMENT (OUTPATIENT)
Dept: ENDOCRINOLOGY | Facility: CLINIC | Age: 49
End: 2024-08-30

## 2024-08-30 LAB
HBA1C MFR BLD HPLC: 8.1
LDLC SERPL DIRECT ASSAY-MCNC: 3.92
MICROALBUMIN/CREAT 24H UR-RTO: 17.3
TSH SERPL-ACNC: 1.79

## 2024-11-22 ENCOUNTER — APPOINTMENT (OUTPATIENT)
Dept: ENDOCRINOLOGY | Facility: CLINIC | Age: 49
End: 2024-11-22
Payer: COMMERCIAL

## 2024-11-22 VITALS
WEIGHT: 178 LBS | HEART RATE: 66 BPM | OXYGEN SATURATION: 98 % | SYSTOLIC BLOOD PRESSURE: 120 MMHG | DIASTOLIC BLOOD PRESSURE: 74 MMHG | HEIGHT: 66 IN | BODY MASS INDEX: 28.61 KG/M2

## 2024-11-22 DIAGNOSIS — E78.5 HYPERLIPIDEMIA, UNSPECIFIED: ICD-10-CM

## 2024-11-22 DIAGNOSIS — R79.89 OTHER SPECIFIED ABNORMAL FINDINGS OF BLOOD CHEMISTRY: ICD-10-CM

## 2024-11-22 DIAGNOSIS — E55.9 VITAMIN D DEFICIENCY, UNSPECIFIED: ICD-10-CM

## 2024-11-22 DIAGNOSIS — E11.9 TYPE 2 DIABETES MELLITUS W/OUT COMPLICATIONS: ICD-10-CM

## 2024-11-22 LAB
GLUCOSE BLDC GLUCOMTR-MCNC: 111
HBA1C MFR BLD HPLC: 8.1

## 2024-11-22 PROCEDURE — 83036 HEMOGLOBIN GLYCOSYLATED A1C: CPT | Mod: QW

## 2024-11-22 PROCEDURE — 82962 GLUCOSE BLOOD TEST: CPT

## 2024-11-22 PROCEDURE — 99214 OFFICE O/P EST MOD 30 MIN: CPT

## 2024-12-03 ENCOUNTER — APPOINTMENT (OUTPATIENT)
Age: 49
End: 2024-12-03
Payer: COMMERCIAL

## 2024-12-03 VITALS — HEIGHT: 66 IN | BODY MASS INDEX: 28.61 KG/M2 | WEIGHT: 178 LBS

## 2024-12-03 DIAGNOSIS — M79.672 PAIN IN LEFT FOOT: ICD-10-CM

## 2024-12-03 DIAGNOSIS — M21.6X2 OTHER ACQUIRED DEFORMITIES OF LEFT FOOT: ICD-10-CM

## 2024-12-03 DIAGNOSIS — M72.2 PLANTAR FASCIAL FIBROMATOSIS: ICD-10-CM

## 2024-12-03 DIAGNOSIS — M21.6X1 OTHER ACQUIRED DEFORMITIES OF RIGHT FOOT: ICD-10-CM

## 2024-12-03 DIAGNOSIS — M79.671 PAIN IN RIGHT FOOT: ICD-10-CM

## 2024-12-03 PROCEDURE — 99204 OFFICE O/P NEW MOD 45 MIN: CPT

## 2024-12-03 RX ORDER — MELOXICAM 7.5 MG/1
7.5 TABLET ORAL TWICE DAILY
Qty: 30 | Refills: 1 | Status: ACTIVE | COMMUNITY
Start: 2024-12-03 | End: 1900-01-01

## 2024-12-17 RX ORDER — ROSUVASTATIN CALCIUM 10 MG/1
10 TABLET, FILM COATED ORAL
Qty: 1 | Refills: 1 | Status: ACTIVE | COMMUNITY
Start: 2024-12-17 | End: 1900-01-01

## 2024-12-31 ENCOUNTER — OFFICE (OUTPATIENT)
Dept: URBAN - METROPOLITAN AREA CLINIC 115 | Facility: CLINIC | Age: 49
Setting detail: OPHTHALMOLOGY
End: 2024-12-31
Payer: COMMERCIAL

## 2024-12-31 DIAGNOSIS — H35.373: ICD-10-CM

## 2024-12-31 DIAGNOSIS — H26.491: ICD-10-CM

## 2024-12-31 DIAGNOSIS — H25.12: ICD-10-CM

## 2024-12-31 DIAGNOSIS — H01.005: ICD-10-CM

## 2024-12-31 DIAGNOSIS — H01.002: ICD-10-CM

## 2024-12-31 DIAGNOSIS — H35.40: ICD-10-CM

## 2024-12-31 DIAGNOSIS — H52.4: ICD-10-CM

## 2024-12-31 DIAGNOSIS — E11.3293: ICD-10-CM

## 2024-12-31 DIAGNOSIS — Z96.1: ICD-10-CM

## 2024-12-31 PROCEDURE — 92014 COMPRE OPH EXAM EST PT 1/>: CPT | Performed by: OPHTHALMOLOGY

## 2024-12-31 ASSESSMENT — REFRACTION_CURRENTRX
OD_CYLINDER: -0.25
OS_SPHERE: -0.25
OD_AXIS: 098
OD_VPRISM_DIRECTION: BF
OS_ADD: +1.75
OD_ADD: +2.50
OS_SPHERE: -0.25
OD_CYLINDER: -0.25
OD_SPHERE: -1.00
OD_ADD: +2.50
OS_VPRISM_DIRECTION: BF
OS_ADD: +1.50
OD_AXIS: 015
OS_OVR_VA: 20/
OS_OVR_VA: 20/
OS_VPRISM_DIRECTION: BF
OS_AXIS: 173
OS_CYLINDER: -0.75
OD_SPHERE: -1.00
OD_OVR_VA: 20/
OS_AXIS: 000
OS_CYLINDER: 0.00
OD_OVR_VA: 20/
OD_VPRISM_DIRECTION: BF

## 2024-12-31 ASSESSMENT — CONFRONTATIONAL VISUAL FIELD TEST (CVF)
OS_FINDINGS: FULL
OD_FINDINGS: FULL

## 2024-12-31 ASSESSMENT — REFRACTION_MANIFEST
OS_VA1: 20/20
OS_SPHERE: -0.25
OD_VA1: 20/20
OS_VA2: 20/20(J1+)
OD_CYLINDER: -0.25
OS_ADD: +1.50
OD_AXIS: 015
OD_VA2: 20/20(J1+)
OD_CYLINDER: -0.25
OD_SPHERE: -1.00
OD_SPHERE: -1.00
OS_CYLINDER: -0.50
OD_ADD: +2.25
OS_SPHERE: PLANO
OS_CYLINDER: -0.50
OS_AXIS: 180
OD_AXIS: 020
OS_ADD: +1.50
OD_VA1: 20/20
OD_ADD: +2.50
OS_AXIS: 175
OS_VA1: 20/25

## 2024-12-31 ASSESSMENT — VISUAL ACUITY
OD_BCVA: 20/20
OS_BCVA: 20/25

## 2024-12-31 ASSESSMENT — TONOMETRY
OD_IOP_MMHG: 18
OS_IOP_MMHG: 17

## 2025-01-03 LAB
HBA1C MFR BLD HPLC: 8.6
LDLC SERPL DIRECT ASSAY-MCNC: 137
TSH SERPL-ACNC: 0.84

## 2025-01-06 ENCOUNTER — APPOINTMENT (OUTPATIENT)
Age: 50
End: 2025-01-06

## 2025-01-06 ENCOUNTER — APPOINTMENT (OUTPATIENT)
Dept: ENDOCRINOLOGY | Facility: CLINIC | Age: 50
End: 2025-01-06

## 2025-04-13 ENCOUNTER — OFFICE (OUTPATIENT)
Dept: URBAN - METROPOLITAN AREA CLINIC 94 | Facility: CLINIC | Age: 50
Setting detail: OPHTHALMOLOGY
End: 2025-04-13
Payer: COMMERCIAL

## 2025-04-13 DIAGNOSIS — H25.12: ICD-10-CM

## 2025-04-13 DIAGNOSIS — H35.373: ICD-10-CM

## 2025-04-13 DIAGNOSIS — E11.3293: ICD-10-CM

## 2025-04-13 DIAGNOSIS — H52.4: ICD-10-CM

## 2025-04-13 DIAGNOSIS — H40.013: ICD-10-CM

## 2025-04-13 DIAGNOSIS — H31.002: ICD-10-CM

## 2025-04-13 PROCEDURE — 92012 INTRM OPH EXAM EST PATIENT: CPT | Performed by: STUDENT IN AN ORGANIZED HEALTH CARE EDUCATION/TRAINING PROGRAM

## 2025-04-13 PROCEDURE — 92015 DETERMINE REFRACTIVE STATE: CPT | Performed by: STUDENT IN AN ORGANIZED HEALTH CARE EDUCATION/TRAINING PROGRAM

## 2025-04-13 PROCEDURE — 92250 FUNDUS PHOTOGRAPHY W/I&R: CPT | Performed by: STUDENT IN AN ORGANIZED HEALTH CARE EDUCATION/TRAINING PROGRAM

## 2025-04-13 ASSESSMENT — LID EXAM ASSESSMENTS
OD_BLEPHARITIS: 1+
OS_MEIBOMITIS: T
OS_BLEPHARITIS: 1+
OD_MEIBOMITIS: T

## 2025-04-13 ASSESSMENT — CONFRONTATIONAL VISUAL FIELD TEST (CVF)
OS_FINDINGS: FULL
OD_FINDINGS: FULL

## 2025-04-13 ASSESSMENT — TONOMETRY
OS_IOP_MMHG: 15
OD_IOP_MMHG: 14

## 2025-04-15 PROBLEM — H31.002 CHORIORETINAL SCARS; LEFT EYE: Status: ACTIVE | Noted: 2025-04-13

## 2025-04-15 PROBLEM — H43.391 VITREOUS FLOATERS; RIGHT EYE: Status: ACTIVE | Noted: 2025-04-13

## 2025-04-15 ASSESSMENT — REFRACTION_MANIFEST
OD_AXIS: 015
OD_VA1: 20/20
OS_VA2: 20/20
OS_VA1: 20/20
OS_VA2: 20/20(J1+)
OD_ADD: +2.25
OD_CYLINDER: -0.25
OS_ADD: +1.50
OD_SPHERE: -1.00
OS_SPHERE: -0.25
OS_CYLINDER: -0.50
OD_ADD: +2.50
OS_AXIS: 175
OD_VA1: 20/20
OS_ADD: +2.00
OS_CYLINDER: -0.50
OD_AXIS: 020
OS_SPHERE: PLANO
OD_CYLINDER: -0.25
OS_AXIS: 180
OS_AXIS: 055
OS_SPHERE: +0.25
OS_CYLINDER: -0.25
OD_VA2: 20/20(J1+)
OD_VA1: 20/20
OD_SPHERE: -1.00
OD_CYLINDER: -0.25
OD_ADD: +3.00
OD_VA2: 20/20
OD_AXIS: 005
OS_VA1: 20/20
OD_SPHERE: -1.00
OS_VA1: 20/25
OS_ADD: +1.50
OU_VA: 20/20

## 2025-04-15 ASSESSMENT — VISUAL ACUITY
OD_BCVA: 20/20
OS_BCVA: 20/25

## 2025-04-15 ASSESSMENT — KERATOMETRY
METHOD_AUTO_MANUAL: AUTO
OD_K1POWER_DIOPTERS: 41.00
OS_K1POWER_DIOPTERS: 40.75
OS_AXISANGLE_DEGREES: 108
OD_AXISANGLE_DEGREES: 086
OS_K2POWER_DIOPTERS: 41.25
OD_K2POWER_DIOPTERS: 41.25

## 2025-04-15 ASSESSMENT — REFRACTION_CURRENTRX
OD_ADD: +2.50
OS_VPRISM_DIRECTION: BF
OS_CYLINDER: 0.00
OD_OVR_VA: 20/
OD_OVR_VA: 20/
OD_SPHERE: -1.00
OD_ADD: +2.50
OS_AXIS: 173
OS_SPHERE: -0.25
OD_AXIS: 015
OS_OVR_VA: 20/
OS_SPHERE: -0.25
OS_ADD: +1.50
OD_CYLINDER: -0.25
OS_OVR_VA: 20/
OS_ADD: +1.75
OS_VPRISM_DIRECTION: BF
OD_SPHERE: -1.00
OD_AXIS: 098
OS_AXIS: 000
OD_CYLINDER: -0.25
OS_CYLINDER: -0.75
OD_VPRISM_DIRECTION: BF
OD_VPRISM_DIRECTION: BF

## 2025-04-15 ASSESSMENT — REFRACTION_AUTOREFRACTION
OD_AXIS: 006
OS_SPHERE: +0.25
OD_SPHERE: -1.25
OS_CYLINDER: -0.25
OD_CYLINDER: -0.25
OS_AXIS: 056

## 2025-05-11 ENCOUNTER — OFFICE (OUTPATIENT)
Dept: URBAN - METROPOLITAN AREA CLINIC 94 | Facility: CLINIC | Age: 50
Setting detail: OPHTHALMOLOGY
End: 2025-05-11
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
## 2025-05-11 PROCEDURE — NO SHOW FE NO SHOW FEE: Performed by: STUDENT IN AN ORGANIZED HEALTH CARE EDUCATION/TRAINING PROGRAM

## 2025-07-13 ENCOUNTER — OFFICE (OUTPATIENT)
Dept: URBAN - METROPOLITAN AREA CLINIC 94 | Facility: CLINIC | Age: 50
Setting detail: OPHTHALMOLOGY
End: 2025-07-13
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
## 2025-07-13 PROCEDURE — NO SHOW FE NO SHOW FEE: Performed by: STUDENT IN AN ORGANIZED HEALTH CARE EDUCATION/TRAINING PROGRAM

## (undated) DEVICE — FORCEP RADIAL JAW 4 240CM DISP

## (undated) DEVICE — VALVE ENDO SURESEAL II 0-5FR

## (undated) DEVICE — SNARE CAPTIVATOR COLD RND STIFF 10X2.4X2.8MM 240CM

## (undated) DEVICE — POLY TRAP ETRAP